# Patient Record
Sex: FEMALE | Race: WHITE | NOT HISPANIC OR LATINO | Employment: FULL TIME | URBAN - METROPOLITAN AREA
[De-identification: names, ages, dates, MRNs, and addresses within clinical notes are randomized per-mention and may not be internally consistent; named-entity substitution may affect disease eponyms.]

---

## 2017-03-02 ENCOUNTER — ALLSCRIPTS OFFICE VISIT (OUTPATIENT)
Dept: OTHER | Facility: OTHER | Age: 57
End: 2017-03-02

## 2017-03-02 DIAGNOSIS — E78.5 HYPERLIPIDEMIA: ICD-10-CM

## 2017-03-02 DIAGNOSIS — N95.2 POSTMENOPAUSAL ATROPHIC VAGINITIS: ICD-10-CM

## 2017-03-02 DIAGNOSIS — Z12.39 ENCOUNTER FOR OTHER SCREENING FOR MALIGNANT NEOPLASM OF BREAST: ICD-10-CM

## 2017-04-19 ENCOUNTER — APPOINTMENT (OUTPATIENT)
Dept: PHYSICAL THERAPY | Facility: REHABILITATION | Age: 57
End: 2017-04-19
Payer: COMMERCIAL

## 2017-04-19 DIAGNOSIS — N95.2 POSTMENOPAUSAL ATROPHIC VAGINITIS: ICD-10-CM

## 2017-04-19 PROCEDURE — 97530 THERAPEUTIC ACTIVITIES: CPT

## 2017-04-19 PROCEDURE — 97162 PT EVAL MOD COMPLEX 30 MIN: CPT

## 2017-05-18 ENCOUNTER — GENERIC CONVERSION - ENCOUNTER (OUTPATIENT)
Dept: OTHER | Facility: OTHER | Age: 57
End: 2017-05-18

## 2017-07-05 ENCOUNTER — GENERIC CONVERSION - ENCOUNTER (OUTPATIENT)
Dept: OTHER | Facility: OTHER | Age: 57
End: 2017-07-05

## 2017-09-19 ENCOUNTER — LAB (OUTPATIENT)
Dept: LAB | Facility: HOSPITAL | Age: 57
End: 2017-09-19
Payer: COMMERCIAL

## 2017-09-19 ENCOUNTER — ALLSCRIPTS OFFICE VISIT (OUTPATIENT)
Dept: OTHER | Facility: OTHER | Age: 57
End: 2017-09-19

## 2017-09-19 DIAGNOSIS — N39.0 URINARY TRACT INFECTION: ICD-10-CM

## 2017-09-19 LAB
BACTERIA UR QL AUTO: ABNORMAL /HPF
BILIRUB UR QL STRIP: NEGATIVE
BILIRUB UR QL STRIP: NORMAL
CLARITY UR: CLEAR
CLARITY UR: NORMAL
COLOR UR: YELLOW
COLOR UR: YELLOW
GLUCOSE (HISTORICAL): NORMAL
GLUCOSE UR STRIP-MCNC: NEGATIVE MG/DL
HGB UR QL STRIP.AUTO: NEGATIVE
HGB UR QL STRIP.AUTO: NORMAL
HYALINE CASTS #/AREA URNS LPF: ABNORMAL /LPF
KETONES UR STRIP-MCNC: NEGATIVE MG/DL
KETONES UR STRIP-MCNC: NORMAL MG/DL
LEUKOCYTE ESTERASE UR QL STRIP: ABNORMAL
LEUKOCYTE ESTERASE UR QL STRIP: NORMAL
NITRITE UR QL STRIP: NORMAL
NITRITE UR QL STRIP: POSITIVE
NON-SQ EPI CELLS URNS QL MICRO: ABNORMAL /HPF
PH UR STRIP.AUTO: 5 [PH]
PH UR STRIP.AUTO: 6 [PH] (ref 4.5–8)
PROT UR STRIP-MCNC: NEGATIVE MG/DL
PROT UR STRIP-MCNC: NORMAL MG/DL
RBC #/AREA URNS AUTO: ABNORMAL /HPF
SP GR UR STRIP.AUTO: 1.01
SP GR UR STRIP.AUTO: 1.01 (ref 1–1.03)
UROBILINOGEN UR QL STRIP.AUTO: 0.2 E.U./DL
UROBILINOGEN UR QL STRIP.AUTO: NORMAL
WBC #/AREA URNS AUTO: ABNORMAL /HPF

## 2017-09-19 PROCEDURE — 87186 SC STD MICRODIL/AGAR DIL: CPT

## 2017-09-19 PROCEDURE — 87077 CULTURE AEROBIC IDENTIFY: CPT

## 2017-09-19 PROCEDURE — 87086 URINE CULTURE/COLONY COUNT: CPT

## 2017-09-19 PROCEDURE — 81001 URINALYSIS AUTO W/SCOPE: CPT

## 2017-09-23 LAB — BACTERIA UR CULT: NORMAL

## 2017-10-26 NOTE — PROGRESS NOTES
Assessment  1  Urinary tract infection (599 0) (N39 0)   2  Atopic dermatitis (691 8) (L20 9)    Plan  Urinary tract infection    · Ciprofloxacin HCl - 500 MG Oral Tablet; Take 1 tablet twice daily   · Cephalexin 250 MG Oral Capsule (Keflex); One pill daily as needed   · (1) URINALYSIS w URINE C/S REFLEX (will reflex a microscopy if leukocytes, occult  blood, or nitrites are not within normal limits); Status:Active - Retrospective By Protocol  Authorization; Requested for:[id number];    · Urine Dip Non-Automated- POC; Status:Complete - Retrospective By Protocol  Authorization;   Done: [id number] 03:32PM    Discussion/Summary    #1  Acute urinary tract infection  Patient's urine was positive for both weight and trace red blood cells  Patient's urine will be sent for culture and patient was placed on Cipro 500 mg by mouth twice a day for 5 days  Patient was told we will call her if we need to change the antibiotic after cultures obtained  Atopic dermatitis  Patient was told to obtain over-the-counter Lotrimin cream and apply this 50-50 with hydrocortisone cream to see if it alleviates her rash and pruritus  Patient was told to contact her dermatologist if it is not working  The patient was counseled regarding diagnostic results,-- instructions for management,-- risk factor reductions,-- prognosis,-- patient and family education,-- impressions,-- risks and benefits of treatment options,-- importance of compliance with treatment  Possible side effects of new medications were reviewed with the patient/guardian today  The treatment plan was reviewed with the patient/guardian  The patient/guardian understands and agrees with the treatment plan      Chief Complaint  Patient is here today for a uti      History of Present Illness  HPI: Patient is a 54-year-old female with a history of no major medical problems  Patient is complaining of dysuria and urinary frequency over the past few days   She denies any fever or chills or back pain  She does have a history of recurrent urinary tract infections which we've been able to control with her taking Keflex after sexual intercourse  Hospital Based Practices Required Assessment:   Pain Assessment   the patient states they have pain  The pain is located in the Dysuria  The patient describes the pain as burning  (on a scale of 0 to 10, the patient rates the pain at 2 )   Abuse And Domestic Violence Screen    Yes, the patient is safe at home  -- The patient states no one is hurting them  Depression And Suicide Screen  No, the patient has not had thoughts of hurting themself  No, the patient has not felt depressed in the past 7 days  Primary Language is  English  Readiness To Learn: Receptive  Barriers To Learning: none  Preferred Learning: verbal   Education Completed: disease/condition   Teaching Method: verbal   Person Taught: patient   Evaluation Of Learning: verbalized/demonstrated understanding      Review of Systems    Constitutional: feeling poorly-- and-- feeling tired, but-- no fever,-- no recent weight gain,-- no chills-- and-- no recent weight loss  ENT: no ear ache, no loss of hearing, no nosebleeds or nasal discharge, no sore throat or hoarseness  Cardiovascular: no complaints of slow or fast heart rate, no chest pain, no palpitations, no leg claudication or lower extremity edema  Respiratory: no complaints of shortness of breath, no wheezing, no dyspnea on exertion, no orthopnea or PND  Breasts: no complaints of breast pain, breast lump or nipple discharge  Gastrointestinal: no complaints of abdominal pain, no constipation, no nausea or diarrhea, no vomiting, no bloody stools  Genitourinary: dysuria-- and-- Urinary frequency, but-- no pelvic pain,-- no vaginal discharge,-- no incontinence,-- no dysmenorrhea-- and-- no unexplained vaginal bleeding     Musculoskeletal: no complaints of arthralgia, no myalgia, no joint swelling or stiffness, no limb pain or swelling  Integumentary: rash,-- itching-- and-- Rash which developed between the breasts itching, but-- no dry skin,-- no skin lesions-- and-- no skin wound  Neurological: no complaints of headache, no confusion, no numbness or tingling, no dizziness or fainting  ROS reviewed  Active Problems  1  Acute sinusitis (461 9) (J01 90)   2  Encounter for routine gynecological examination (V72 31) (Z01 419)   3  Encounter for screening breast examination (V76 10) (Z12 31)   4  Encounter for screening colonoscopy (V76 51) (Z12 11)   5  Encounter for screening mammogram for breast cancer (V76 12) (Z12 31)   6  Fatigue (780 79) (R53 83)   7  Hyperlipidemia (272 4) (E78 5)   8  Insect bite (919 4,E906 4) (W57 XXXA)   9  Menopausal problem (627 9)   10  Urinary tract infection (599 0) (N39 0)   11  Vaginal atrophy (627 3) (N95 2)   12  Vitamin D deficiency (268 9)    Past Medical History  Active Problems And Past Medical History Reviewed: The active problems and past medical history were reviewed and updated today  Social History   · Currently sexually active   · Daily caffeine consumption, 1 serving a day   · Exercises 3 to 4 times per week (V49 89) (Z78 9)   ·    · 14 years   · Never a smoker   · Never A Smoker   · No illicit drug use   · Occasional alcohol use   · Two children  The social history was reviewed and updated today  The social history was reviewed and is unchanged  Family History  Family History Reviewed: The family history was reviewed and updated today  Current Meds   1  Fluticasone Propionate 50 MCG/ACT Nasal Suspension; USE 1 SPRAY IN EACH   NOSTRIL TWICE DAILY; Therapy: 48KZP4407 to (Last Ardath Suzette)  Requested for: 69MKN3562 Ordered   2  Vagifem 10 MCG Vaginal Tablet; Insert intravaginally twice weely; Therapy: 27Apr2017 to (Last Faustine Life)  Requested for: 27Apr2017 Ordered   3  Vagifem 10 MCG Vaginal Tablet;  Insert one tabl intravaginally daily x 2 weeks then twice   weekly; Therapy: [id number] to (Last Rx:02Mar2017)  Requested for: 02Mar2017 Ordered    The medication list was reviewed and updated today  Allergies  1  Codeine Derivatives    Physical Exam    Constitutional Pleasant, healthy-appearing 53-year-old female who is awake alert  Eyes   Conjunctiva and lids: No swelling, erythema or discharge  Pupils and irises: Equal, round and reactive to light  Ears, Nose, Mouth, and Throat   External inspection of ears and nose: Normal     Otoscopic examination: Tympanic membranes translucent with normal light reflex  Canals patent without erythema  Nasal mucosa, septum, and turbinates: Normal without edema or erythema  Oropharynx: Normal with no erythema, edema, exudate or lesions  Pulmonary   Respiratory effort: No increased work of breathing or signs of respiratory distress  Auscultation of lungs: Clear to auscultation  Cardiovascular   Palpation of heart: Normal PMI, no thrills  Auscultation of heart: Normal rate and rhythm, normal S1 and S2, without murmurs  Examination of extremities for edema and/or varicosities: Normal     Carotid pulses: Normal     Abdomen   Abdomen: Abnormal  -- Soft nontender with slight suprapubic tenderness to palpation and fullness, no CVA tenderness to percussion  Liver and spleen: No hepatomegaly or splenomegaly  Lymphatic   Palpation of lymph nodes in neck: No lymphadenopathy  Musculoskeletal   Gait and station: Normal     Digits and nails: Normal without clubbing or cyanosis  Inspection/palpation of joints, bones, and muscles: Normal     Skin   Skin and subcutaneous tissue: Normal without rashes or lesions  Examination of the skin for lesions: Abnormal  -- Slightly raised reddish rash between both breasts which she states is causing a lot of itching  Neurologic   Cranial nerves: Cranial nerves 2-12 intact  Reflexes: 2+ and symmetric  Sensation: No sensory loss      Psychiatric Orientation to person, place, and time: Normal     Mood and affect: Normal          Results/Data  Urine Dip Non-Automated- POC [id number] 03:32PM Kike Santana     Test Name Result Flag Reference   Color Yellow     Clarity Transparent     Leukocytes ++     Nitrite neg     Blood trace     Bilirubin neg     Urobilinogen neg     Protein neg     Ph 5     Specific Gravity 1 010     Ketone neg     Glucose normal         Signatures   Electronically signed by : Marion Holden DO; Sep 19 2017  6:08PM EST                       (Author)

## 2018-01-14 VITALS
WEIGHT: 122 LBS | HEART RATE: 76 BPM | HEIGHT: 64 IN | BODY MASS INDEX: 20.83 KG/M2 | SYSTOLIC BLOOD PRESSURE: 116 MMHG | DIASTOLIC BLOOD PRESSURE: 64 MMHG | OXYGEN SATURATION: 99 %

## 2018-02-09 ENCOUNTER — TRANSCRIBE ORDERS (OUTPATIENT)
Dept: ADMINISTRATIVE | Facility: HOSPITAL | Age: 58
End: 2018-02-09

## 2018-04-25 NOTE — PROGRESS NOTES
Assessment/Plan:    Calcium 1200-1500mg + 600-1000 IU Vit D daily  Pap with HR HPV q 3 years-done  Annual mammogram Colonoscopy-due now  Monthly BSE  Exercise 150 minutes per week minimum  Kegels 20 times twice daily  Silicone based lubricant with sex  Declines vagifem rx currently  Diagnoses and all orders for this visit:    Encntr for gyn exam (general) (routine) w/o abn findings  -     Liquid-based pap, screening    Encounter for screening mammogram for breast cancer  -     Mammo screening bilateral w 3d & cad; Future    Vaginal atrophy    Other orders  -     estradiol (VAGIFEM) 10 MCG TABS vaginal tablet; Insert into the vagina          Subjective:      Patient ID: Dusty Payne is a 61 y o  female  Patient here for annual visit  Followed up with [facility] and never needed to use vagifem  She swears by slippery stuff lubricant  No dyspareunia and believes the difference is night and day  Does her kegels regularly  No health concerns  NO vaginal bleeding  LMP at age 51  No menopausal symptoms   is well  The following portions of the patient's history were reviewed and updated as appropriate: allergies, current medications, past family history, past medical history, past social history, past surgical history and problem list     Review of Systems   Constitutional: Negative  Negative for activity change, appetite change, chills, diaphoresis, fatigue, fever and unexpected weight change  HENT: Negative for congestion, dental problem, sneezing, sore throat and trouble swallowing  Eyes: Negative for visual disturbance  Respiratory: Negative for chest tightness and shortness of breath  Cardiovascular: Negative for chest pain and leg swelling  Gastrointestinal: Negative for abdominal pain, constipation, diarrhea, nausea and vomiting     Genitourinary: Negative for difficulty urinating, dyspareunia, dysuria, frequency, hematuria, menstrual problem, pelvic pain, urgency, vaginal bleeding, vaginal discharge and vaginal pain  Musculoskeletal: Negative for back pain and neck pain  Skin: Negative  Allergic/Immunologic: Negative  Neurological: Negative for weakness and headaches  Hematological: Negative for adenopathy  Psychiatric/Behavioral: Negative  Objective: There were no vitals taken for this visit  Physical Exam   Constitutional: She is oriented to person, place, and time  She appears well-developed and well-nourished  HENT:   Head: Normocephalic and atraumatic  Eyes: Right eye exhibits no discharge  Left eye exhibits no discharge  Neck: Normal range of motion  Neck supple  Cardiovascular: Normal rate, regular rhythm, normal heart sounds and intact distal pulses  Pulmonary/Chest: Effort normal and breath sounds normal    Abdominal: Soft  Genitourinary: Vagina normal and uterus normal  Rectal exam shows no external hemorrhoid  No breast swelling, tenderness, discharge or bleeding  No labial fusion  There is no rash, tenderness, lesion or injury on the right labia  There is no rash, tenderness, lesion or injury on the left labia  Cervix exhibits no motion tenderness, no discharge and no friability  Right adnexum displays no mass, no tenderness and no fullness  Left adnexum displays no mass, no tenderness and no fullness  No erythema, tenderness or bleeding in the vagina  No foreign body in the vagina  No signs of injury around the vagina  No vaginal discharge found  Genitourinary Comments: Slight urethral prolapse  vulvovagianl atrophy   Musculoskeletal: Normal range of motion  Lymphadenopathy:     She has no cervical adenopathy  Right: No inguinal adenopathy present  Left: No inguinal adenopathy present  Neurological: She is alert and oriented to person, place, and time  Skin: Skin is warm and dry  Psychiatric: She has a normal mood and affect  Nursing note and vitals reviewed

## 2018-04-26 ENCOUNTER — TELEPHONE (OUTPATIENT)
Dept: INTERNAL MEDICINE CLINIC | Facility: CLINIC | Age: 58
End: 2018-04-26

## 2018-04-26 ENCOUNTER — TELEPHONE (OUTPATIENT)
Dept: GASTROENTEROLOGY | Facility: CLINIC | Age: 58
End: 2018-04-26

## 2018-04-26 ENCOUNTER — ANNUAL EXAM (OUTPATIENT)
Dept: GYNECOLOGY | Facility: CLINIC | Age: 58
End: 2018-04-26
Payer: COMMERCIAL

## 2018-04-26 VITALS
BODY MASS INDEX: 20.83 KG/M2 | DIASTOLIC BLOOD PRESSURE: 80 MMHG | WEIGHT: 122 LBS | HEIGHT: 64 IN | HEART RATE: 85 BPM | SYSTOLIC BLOOD PRESSURE: 128 MMHG

## 2018-04-26 DIAGNOSIS — E55.9 VITAMIN D DEFICIENCY: ICD-10-CM

## 2018-04-26 DIAGNOSIS — N95.2 VAGINAL ATROPHY: ICD-10-CM

## 2018-04-26 DIAGNOSIS — R53.83 FATIGUE, UNSPECIFIED TYPE: ICD-10-CM

## 2018-04-26 DIAGNOSIS — E78.01 FAMILIAL HYPERCHOLESTEROLEMIA: ICD-10-CM

## 2018-04-26 DIAGNOSIS — Z12.11 COLON CANCER SCREENING: Primary | ICD-10-CM

## 2018-04-26 DIAGNOSIS — Z12.31 ENCOUNTER FOR SCREENING MAMMOGRAM FOR BREAST CANCER: ICD-10-CM

## 2018-04-26 DIAGNOSIS — Z01.419 ENCNTR FOR GYN EXAM (GENERAL) (ROUTINE) W/O ABN FINDINGS: Primary | ICD-10-CM

## 2018-04-26 PROCEDURE — S0612 ANNUAL GYNECOLOGICAL EXAMINA: HCPCS | Performed by: NURSE PRACTITIONER

## 2018-04-26 PROCEDURE — G0145 SCR C/V CYTO,THINLAYER,RESCR: HCPCS | Performed by: NURSE PRACTITIONER

## 2018-04-26 PROCEDURE — 87624 HPV HI-RISK TYP POOLED RSLT: CPT | Performed by: NURSE PRACTITIONER

## 2018-04-26 RX ORDER — ESTRADIOL 10 UG/1
INSERT VAGINAL
Status: ON HOLD | COMMUNITY
Start: 2017-04-27 | End: 2018-08-23 | Stop reason: ALTCHOICE

## 2018-04-26 NOTE — PATIENT INSTRUCTIONS
Calcium 1200-1500mg + 600-1000 IU Vit D daily  Pap with HR HPV q 3 years-done  Annual mammogram Colonoscopy-due now  Monthly BSE  Exercise 150 minutes per week minimum  Kegels 20 times twice daily  Silicone based lubricant with sex  Declines vagifem rx currently

## 2018-04-26 NOTE — TELEPHONE ENCOUNTER
Pt would like some labs placed in her chart for her yearly visit,she will [facility]  She will schedule appt after she gets her bloodwork,she can be reached at [phone]

## 2018-05-01 ENCOUNTER — HOSPITAL ENCOUNTER (OUTPATIENT)
Dept: RADIOLOGY | Age: 58
Discharge: HOME | End: 2018-05-01
Payer: COMMERCIAL

## 2018-05-01 DIAGNOSIS — Z12.31 ENCOUNTER FOR SCREENING MAMMOGRAM FOR BREAST CANCER: ICD-10-CM

## 2018-05-01 LAB — HPV RRNA GENITAL QL NAA+PROBE: NORMAL

## 2018-05-01 PROCEDURE — 77067 SCR MAMMO BI INCL CAD: CPT

## 2018-05-01 PROCEDURE — 77063 BREAST TOMOSYNTHESIS BI: CPT

## 2018-05-02 LAB
LAB AP GYN PRIMARY INTERPRETATION: NORMAL
Lab: NORMAL

## 2018-07-26 ENCOUNTER — OFFICE VISIT (OUTPATIENT)
Dept: INTERNAL MEDICINE CLINIC | Facility: CLINIC | Age: 58
End: 2018-07-26
Payer: COMMERCIAL

## 2018-07-26 VITALS
WEIGHT: 119 LBS | SYSTOLIC BLOOD PRESSURE: 122 MMHG | HEIGHT: 64 IN | RESPIRATION RATE: 16 BRPM | OXYGEN SATURATION: 99 % | BODY MASS INDEX: 20.32 KG/M2 | HEART RATE: 109 BPM | DIASTOLIC BLOOD PRESSURE: 74 MMHG | TEMPERATURE: 99.2 F

## 2018-07-26 DIAGNOSIS — J06.9 UPPER RESPIRATORY TRACT INFECTION, UNSPECIFIED TYPE: Primary | ICD-10-CM

## 2018-07-26 PROCEDURE — 99213 OFFICE O/P EST LOW 20 MIN: CPT | Performed by: NURSE PRACTITIONER

## 2018-07-26 RX ORDER — AZITHROMYCIN 250 MG/1
TABLET, FILM COATED ORAL
Qty: 6 TABLET | Refills: 0 | Status: SHIPPED | OUTPATIENT
Start: 2018-07-26 | End: 2018-07-30

## 2018-07-26 NOTE — PROGRESS NOTES
Assessment/Plan:    Upper respiratory tract infection  Symptoms persist >2 weeks with inability to complete treatment with amoxicillin as prescribed by urgent care  Pt reports that her symptoms always respond to azithromycin  She is given an rx for a z pack and mucinex dm BID  Pt advised to use flonase BID as well to help decrease nasal inflammation  Recommended gargling with salt water and dressing warmly for work  Pt instructed to call for reevaluation if sx worsen or persist          Diagnoses and all orders for this visit:    Upper respiratory tract infection, unspecified type  -     azithromycin (ZITHROMAX) 250 mg tablet; Take 2 tablets today then 1 tablet daily x 4 days  -     dextromethorphan-guaifenesin (MUCINEX DM)  MG per 12 hr tablet; Take 1 tablet by mouth 2 (two) times a day as needed for cough          Subjective:      Patient ID: Avel Elmore is a 61 y o  female  Pt is a 61 y o  y/o female who is seen today for evaluation of URI symptoms that began 2 weeks ago  She was evaluated at urgent care and was treated with amoxicillin  She did not tolerate the medication well with terrible GI side effects so she did not complete therapy  Her symptoms include sore throat, sputum production that "sits in her throat", ear and sinus pressure  She denies headache, dizziness, fever/chills  Appetite is normal, no N/V/D  Pt has tried otc cough suppressants  She has pmh of seasonal allergies but her symptoms occur in the fall  Pt reports that her office at work was relocated to a small space that is excessively air conditioned and she feels that her environment is predisposing her to URI            The following portions of the patient's history were reviewed and updated as appropriate: allergies, current medications, past family history, past medical history, past social history, past surgical history and problem list     Review of Systems   Constitutional: Negative for appetite change, chills, fatigue and fever  HENT: Positive for congestion, ear pain, postnasal drip, sore throat, trouble swallowing and voice change  Negative for sinus pressure  Respiratory: Negative for cough, chest tightness, shortness of breath and wheezing  Cardiovascular: Negative for chest pain, palpitations and leg swelling  Gastrointestinal: Negative for diarrhea, nausea and vomiting  Musculoskeletal: Negative for myalgias  Allergic/Immunologic: Positive for environmental allergies (fall seasonal allergies)  Neurological: Negative for dizziness and headaches  Hematological: Negative for adenopathy  Psychiatric/Behavioral: Negative for sleep disturbance  Objective:      /74 (BP Location: Left arm, Patient Position: Sitting, Cuff Size: Standard)   Pulse (!) 109   Temp 99 2 °F (37 3 °C)   Resp 16   Ht 5' 4" (1 626 m)   Wt 54 kg (119 lb)   SpO2 99%   BMI 20 43 kg/m²          Physical Exam   Constitutional: She is oriented to person, place, and time  She appears well-developed and well-nourished  She is cooperative  HENT:   Head: Normocephalic  Right Ear: Hearing, tympanic membrane, external ear and ear canal normal  Tympanic membrane is not bulging  No middle ear effusion  Left Ear: Hearing, tympanic membrane, external ear and ear canal normal  Tympanic membrane is not bulging  No middle ear effusion  Nose: Mucosal edema present  No rhinorrhea  Mouth/Throat: Mucous membranes are not dry  No oropharyngeal exudate, posterior oropharyngeal edema, posterior oropharyngeal erythema or tonsillar abscesses  Post nasal drip  Eyes: Conjunctivae and lids are normal  Pupils are equal, round, and reactive to light  Neck: Normal range of motion  No JVD present  Carotid bruit is not present  Cardiovascular: Normal rate, regular rhythm, normal heart sounds and intact distal pulses  No murmur heard  Pulmonary/Chest: Effort normal and breath sounds normal  No accessory muscle usage   No respiratory distress  She has no decreased breath sounds  She has no wheezes  She has no rhonchi  She has no rales  Musculoskeletal: Normal range of motion  She exhibits no edema  Lymphadenopathy:        Head (right side): No submental, no submandibular, no tonsillar, no preauricular, no posterior auricular and no occipital adenopathy present  Head (left side): No submental, no submandibular, no tonsillar, no preauricular, no posterior auricular and no occipital adenopathy present  She has no cervical adenopathy  Neurological: She is alert and oriented to person, place, and time  She has normal strength  Skin: Skin is warm, dry and intact  Psychiatric: She has a normal mood and affect  Her speech is normal and behavior is normal  Judgment and thought content normal  Cognition and memory are normal    Vitals reviewed

## 2018-07-26 NOTE — ASSESSMENT & PLAN NOTE
Symptoms persist >2 weeks with inability to complete treatment with amoxicillin as prescribed by urgent care  Pt reports that her symptoms always respond to azithromycin  She is given an rx for a z pack and mucinex dm BID  Pt advised to use flonase BID as well to help decrease nasal inflammation  Recommended gargling with salt water and dressing warmly for work    Pt instructed to call for reevaluation if sx worsen or persist

## 2018-07-27 ENCOUNTER — OFFICE VISIT (OUTPATIENT)
Dept: GASTROENTEROLOGY | Facility: AMBULARY SURGERY CENTER | Age: 58
End: 2018-07-27
Payer: COMMERCIAL

## 2018-07-27 VITALS
WEIGHT: 121.6 LBS | HEIGHT: 64 IN | TEMPERATURE: 97.5 F | HEART RATE: 86 BPM | DIASTOLIC BLOOD PRESSURE: 78 MMHG | SYSTOLIC BLOOD PRESSURE: 121 MMHG | BODY MASS INDEX: 20.76 KG/M2

## 2018-07-27 DIAGNOSIS — Z12.11 COLON CANCER SCREENING: Primary | ICD-10-CM

## 2018-07-27 PROCEDURE — 99243 OFF/OP CNSLTJ NEW/EST LOW 30: CPT | Performed by: INTERNAL MEDICINE

## 2018-07-27 NOTE — LETTER
July 27, 2018     Carmen Lopez DO  [address]  [address] [place] [id number]    Patient: Wen Gasca   YOB: 1960   Date of Visit: 7/27/2018       Dear Dr Enedelia Humphrey: Thank you for referring Alvin Mack to me for evaluation  Below are my notes for this consultation  If you have questions, please do not hesitate to call me  I look forward to following your patient along with you  Sincerely,        Latricia Pérez MD        CC: No Recipients  Latricia Pérze MD  7/27/2018  8:03 PM  Sign at close encounter  Consultation - [facility]  Wen Gasca 61 y o  female MRN: [id number]          Assessment & Plan:    Very pleasant 58-year-old female here for average risk screening colonoscopy who is somewhat anxious about the procedure  -we will schedule her colonoscopy  -I discussed with her the risks of the procedure including bleeding, surgery, perforation, missed polyp detection rate        _____________________________________________________________        CC:  Evaluation for colonoscopy    HPI:  Wen Gasca is a 61 y  o female who was referred for evaluation of colonoscopy  As you know this is a very pleasant and healthy 58-year-old female, with no significant medical history, no significant surgical history, denies any GI complaints  Denies any tobacco, rarely drinks  She works in sales and marketing  She has never had a colonoscopy denies any family history of GI or associated malignancies  Patient denies any nausea, vomiting, heartburn, dysphagia, diarrhea, constipation, melena, rectal bleeding  ROS:  The remainder of the ROS was negative except for the pertinent positives mentioned in HPI           Allergies: Codeine    Medications:   Current Outpatient Prescriptions:     azithromycin (ZITHROMAX) 250 mg tablet, Take 2 tablets today then 1 tablet daily x 4 days, Disp: 6 tablet, Rfl: 0    dextromethorphan-guaifenesin (MUCINEX DM)  MG per 12 hr tablet, Take 1 tablet by mouth 2 (two) times a day as needed for cough, Disp: 30 tablet, Rfl: 0    estradiol (VAGIFEM) 10 MCG TABS vaginal tablet, Insert into the vagina, Disp: , Rfl: '    Past Medical History:   Diagnosis Date    Nonadherence to medical treatment     No show for 2 pelvic floor PT appt       Past Surgical History:   Procedure Laterality Date    TUBAL LIGATION         Family History   Problem Relation Age of Onset    Heart attack Father 72    No Known Problems Mother     No Known Problems Sister     No Known Problems Brother     No Known Problems Daughter     No Known Problems Son     No Known Problems Sister     No Known Problems Sister         reports that she has never smoked  She has never used smokeless tobacco  She reports that she drinks alcohol  She reports that she does not use drugs            Physical Exam:     /78 (BP Location: Left arm, Patient Position: Sitting, Cuff Size: Standard)   Pulse 86   Temp 97 5 °F (36 4 °C) (Tympanic)   Ht 5' 4" (1 626 m)   Wt 55 2 kg (121 lb 9 6 oz)   BMI 20 87 kg/m²      Gen: wn/wd, NAD  HEENT: anicteric, MMM, no cervical LAD  CVS: RRR, no m/r/g  CHEST: CTA b/l  ABD: +BS, soft, NT,ND, no hepatosplenomegaly  EXT: no c/c/e  NEURO: aaox3  SKIN: NO rashes

## 2018-07-27 NOTE — PROGRESS NOTES
Consultation - [facility]  Yanet Ceron 61 y o  female MRN: [id number]          Assessment & Plan:    Very pleasant 71-year-old female here for average risk screening colonoscopy who is somewhat anxious about the procedure  -we will schedule her colonoscopy  -I discussed with her the risks of the procedure including bleeding, surgery, perforation, missed polyp detection rate        _____________________________________________________________        CC:  Evaluation for colonoscopy    HPI:  Yanet Ceron is a 61 y  o female who was referred for evaluation of colonoscopy  As you know this is a very pleasant and healthy 71-year-old female, with no significant medical history, no significant surgical history, denies any GI complaints  Denies any tobacco, rarely drinks  She works in sales and marketing  She has never had a colonoscopy denies any family history of GI or associated malignancies  Patient denies any nausea, vomiting, heartburn, dysphagia, diarrhea, constipation, melena, rectal bleeding  ROS:  The remainder of the ROS was negative except for the pertinent positives mentioned in HPI           Allergies: Codeine    Medications:   Current Outpatient Prescriptions:     azithromycin (ZITHROMAX) 250 mg tablet, Take 2 tablets today then 1 tablet daily x 4 days, Disp: 6 tablet, Rfl: 0    dextromethorphan-guaifenesin (MUCINEX DM)  MG per 12 hr tablet, Take 1 tablet by mouth 2 (two) times a day as needed for cough, Disp: 30 tablet, Rfl: 0    estradiol (VAGIFEM) 10 MCG TABS vaginal tablet, Insert into the vagina, Disp: , Rfl: '    Past Medical History:   Diagnosis Date    Nonadherence to medical treatment     No show for 2 pelvic floor PT appt       Past Surgical History:   Procedure Laterality Date    TUBAL LIGATION         Family History   Problem Relation Age of Onset    Heart attack Father 72    No Known Problems Mother     No Known Problems Sister     No Known Problems Brother     No Known Problems Daughter     No Known Problems Son     No Known Problems Sister     No Known Problems Sister         reports that she has never smoked  She has never used smokeless tobacco  She reports that she drinks alcohol  She reports that she does not use drugs            Physical Exam:     /78 (BP Location: Left arm, Patient Position: Sitting, Cuff Size: Standard)   Pulse 86   Temp 97 5 °F (36 4 °C) (Tympanic)   Ht 5' 4" (1 626 m)   Wt 55 2 kg (121 lb 9 6 oz)   BMI 20 87 kg/m²     Gen: wn/wd, NAD  HEENT: anicteric, MMM, no cervical LAD  CVS: RRR, no m/r/g  CHEST: CTA b/l  ABD: +BS, soft, NT,ND, no hepatosplenomegaly  EXT: no c/c/e  NEURO: aaox3  SKIN: NO rashes

## 2018-07-30 DIAGNOSIS — Z12.11 ENCOUNTER FOR SCREENING COLONOSCOPY: Primary | ICD-10-CM

## 2018-07-30 NOTE — PROGRESS NOTES
GI CALLED FOR A PHYSCIAN TO PHYSICIAN REFERRAL TO DR LYNDA FIERRO WITH A DX OF : Z12 11  REFERRAL WAS PLACED

## 2018-08-07 ENCOUNTER — OFFICE VISIT (OUTPATIENT)
Dept: INTERNAL MEDICINE CLINIC | Facility: CLINIC | Age: 58
End: 2018-08-07
Payer: COMMERCIAL

## 2018-08-07 VITALS
DIASTOLIC BLOOD PRESSURE: 84 MMHG | SYSTOLIC BLOOD PRESSURE: 120 MMHG | HEIGHT: 64 IN | TEMPERATURE: 98.7 F | BODY MASS INDEX: 21 KG/M2 | OXYGEN SATURATION: 96 % | WEIGHT: 123 LBS | HEART RATE: 77 BPM

## 2018-08-07 DIAGNOSIS — J30.1 SEASONAL ALLERGIC RHINITIS DUE TO POLLEN: Primary | ICD-10-CM

## 2018-08-07 DIAGNOSIS — J06.9 UPPER RESPIRATORY TRACT INFECTION, UNSPECIFIED TYPE: ICD-10-CM

## 2018-08-07 PROCEDURE — 3008F BODY MASS INDEX DOCD: CPT | Performed by: INTERNAL MEDICINE

## 2018-08-07 PROCEDURE — 99213 OFFICE O/P EST LOW 20 MIN: CPT | Performed by: INTERNAL MEDICINE

## 2018-08-07 RX ORDER — FLUTICASONE PROPIONATE 50 MCG
2 SPRAY, SUSPENSION (ML) NASAL DAILY
Qty: 16 G | Refills: 3 | Status: SHIPPED | OUTPATIENT
Start: 2018-08-07 | End: 2018-12-05 | Stop reason: SDUPTHER

## 2018-08-07 NOTE — ASSESSMENT & PLAN NOTE
Patient had been seen recently in a [facility] facility and also in our office for an upper respiratory tract infection, allergic rhinitis, sinusitis  With her last visit in the office she was placed on a Zithromax Z-Fabio and also instructions to take Mucinex DM to help with her congestion and mucus production  She states that she is still producing a lot of mucus, postnasal drip and on questioning apparently she was not taking the Mucinex DM but the Mucinex D with decongestant which probably made her mucus thicker  Patient is still having problems with station tube dysfunction on the right but exam was normal   She does have erythema to the nares and enlarged written turbinates consistent with allergic rhinitis, thick whitish postnasal drip  Patient was given a prescription for Flonase nasal spray to use 1 spray to each nostril twice a day along with taking Tussin DM to help break up the mucus  She was told if not feeling better by the end week to please call    She was also told that it may take an extended period of time for her to completely recover and have normalization from a station tube dysfunction

## 2018-08-13 ENCOUNTER — ANESTHESIA EVENT (OUTPATIENT)
Dept: PERIOP | Facility: AMBULARY SURGERY CENTER | Age: 58
End: 2018-08-13
Payer: COMMERCIAL

## 2018-08-23 ENCOUNTER — HOSPITAL ENCOUNTER (OUTPATIENT)
Facility: AMBULARY SURGERY CENTER | Age: 58
Setting detail: OUTPATIENT SURGERY
Discharge: HOME | End: 2018-08-23
Attending: INTERNAL MEDICINE | Admitting: INTERNAL MEDICINE
Payer: COMMERCIAL

## 2018-08-23 ENCOUNTER — TELEPHONE (OUTPATIENT)
Dept: GASTROENTEROLOGY | Facility: CLINIC | Age: 58
End: 2018-08-23

## 2018-08-23 ENCOUNTER — ANESTHESIA (OUTPATIENT)
Dept: PERIOP | Facility: AMBULARY SURGERY CENTER | Age: 58
End: 2018-08-23
Payer: COMMERCIAL

## 2018-08-23 VITALS
HEIGHT: 64 IN | TEMPERATURE: 97.4 F | WEIGHT: 120 LBS | SYSTOLIC BLOOD PRESSURE: 122 MMHG | OXYGEN SATURATION: 99 % | DIASTOLIC BLOOD PRESSURE: 76 MMHG | HEART RATE: 62 BPM | RESPIRATION RATE: 20 BRPM | BODY MASS INDEX: 20.49 KG/M2

## 2018-08-23 PROCEDURE — G0121 COLON CA SCRN NOT HI RSK IND: HCPCS | Performed by: INTERNAL MEDICINE

## 2018-08-23 RX ORDER — PROPOFOL 10 MG/ML
INJECTION, EMULSION INTRAVENOUS AS NEEDED
Status: DISCONTINUED | OUTPATIENT
Start: 2018-08-23 | End: 2018-08-23 | Stop reason: SURG

## 2018-08-23 RX ORDER — CETIRIZINE HYDROCHLORIDE 10 MG/1
5 TABLET ORAL AS NEEDED
COMMUNITY

## 2018-08-23 RX ORDER — SODIUM CHLORIDE, SODIUM LACTATE, POTASSIUM CHLORIDE, CALCIUM CHLORIDE 600; 310; 30; 20 MG/100ML; MG/100ML; MG/100ML; MG/100ML
125 INJECTION, SOLUTION INTRAVENOUS CONTINUOUS
Status: DISCONTINUED | OUTPATIENT
Start: 2018-08-23 | End: 2018-08-23 | Stop reason: HOSPADM

## 2018-08-23 RX ORDER — SODIUM CHLORIDE 9 MG/ML
INJECTION, SOLUTION INTRAVENOUS CONTINUOUS PRN
Status: DISCONTINUED | OUTPATIENT
Start: 2018-08-23 | End: 2018-08-23 | Stop reason: SURG

## 2018-08-23 RX ADMIN — PROPOFOL 50 MG: 10 INJECTION, EMULSION INTRAVENOUS at 08:12

## 2018-08-23 RX ADMIN — PROPOFOL 50 MG: 10 INJECTION, EMULSION INTRAVENOUS at 08:35

## 2018-08-23 RX ADMIN — PROPOFOL 50 MG: 10 INJECTION, EMULSION INTRAVENOUS at 08:17

## 2018-08-23 RX ADMIN — PROPOFOL 50 MG: 10 INJECTION, EMULSION INTRAVENOUS at 08:25

## 2018-08-23 RX ADMIN — PROPOFOL 50 MG: 10 INJECTION, EMULSION INTRAVENOUS at 08:28

## 2018-08-23 RX ADMIN — PROPOFOL 50 MG: 10 INJECTION, EMULSION INTRAVENOUS at 08:15

## 2018-08-23 RX ADMIN — PROPOFOL 50 MG: 10 INJECTION, EMULSION INTRAVENOUS at 08:31

## 2018-08-23 RX ADMIN — PROPOFOL 50 MG: 10 INJECTION, EMULSION INTRAVENOUS at 08:22

## 2018-08-23 RX ADMIN — SODIUM CHLORIDE: 0.9 INJECTION, SOLUTION INTRAVENOUS at 08:03

## 2018-08-23 RX ADMIN — PROPOFOL 50 MG: 10 INJECTION, EMULSION INTRAVENOUS at 08:19

## 2018-08-23 RX ADMIN — PROPOFOL 100 MG: 10 INJECTION, EMULSION INTRAVENOUS at 08:08

## 2018-08-23 NOTE — ANESTHESIA PREPROCEDURE EVALUATION
Review of Systems/Medical History          Cardiovascular  Hyperlipidemia,    Pulmonary       GI/Hepatic    Bowel prep            Endo/Other     GYN       Hematology   Musculoskeletal       Neurology   Psychology           Physical Exam    Airway    Mallampati score: II         Dental   No notable dental hx     Cardiovascular      Pulmonary      Other Findings        Anesthesia Plan  ASA Score- 2     Anesthesia Type- IV sedation with anesthesia with ASA Monitors  Additional Monitors:   Airway Plan:     Comment: I, Dr Torin Monson, the attending physician, have personally seen and evaluated the patient prior to anesthetic care  I have reviewed the pre-anesthetic record, and other medical records if appropriate to the anesthetic care  If a CRNA is involved in the case, I have reviewed the CRNA assessment, if present, and agree  The patient is in a suitable condition to proceed with my formulated anesthetic plan        Plan Factors-    Induction- intravenous  Postoperative Plan-     Informed Consent- Anesthetic plan and risks discussed with patient  I personally reviewed this patient with the CRNA  Discussed and agreed on the Anesthesia Plan with the CRNA  Reynaldo Shell

## 2018-08-23 NOTE — ANESTHESIA POSTPROCEDURE EVALUATION
Post-Op Assessment Note      CV Status:  Stable    Mental Status:  Alert and awake    Hydration Status:  Stable and euvolemic    PONV Controlled:  Controlled    Airway Patency:  Patent and adequate    Post Op Vitals Reviewed: Yes          Staff: CRNA           /76 (08/23/18 0840)    Temp     Pulse 60 (08/23/18 0840)   Resp 20 (08/23/18 0840)    SpO2 100 % (08/23/18 0840)

## 2018-08-23 NOTE — OP NOTE
Colonoscopy Procedure Note    Procedure: Colonoscopy    Sedation: Monitored anesthesia care, check anesthesia records      ASA Class: 2    INDICATIONS:  Screening colonoscopy    POST-OP DIAGNOSIS: See the impression below    Procedure Details     Prior colonoscopy: No prior colonoscopy  Informed consent was obtained for the procedure, including sedation  Risks of perforation, hemorrhage, adverse drug reaction and aspiration were discussed  The patient was placed in the left lateral decubitus position  Based on the pre-procedure assessment, including review of the patient's medical history, medications, allergies, and review of systems, she had been deemed to be an appropriate candidate for conscious sedation; she was therefore sedated with the medications listed below  The patient was monitored continuously with telemetry, pulse oximetry, blood pressure monitoring, and direct observations  A rectal examination was performed  The colonoscope was inserted into the rectum and advanced under direct vision to the cecum, which was identified by the ileocecal valve and appendiceal orifice  The quality of the colonic preparation was good  A careful inspection was made as the colonoscope was withdrawn, including a retroflexed view of the rectum; findings and interventions are described below  Findings:    After extensive irrigation with approximately 2 L of water, patient had a good prep and a clean colonoscopy  Normal examination with good visualization  Normal retroflexion           Complications: None; patient tolerated the procedure well  Impression: We were able to achieve a good prep after extensive irrigation  Otherwise normal colonoscopy with good prep good visualization    Recommendations:  Repeat colonoscopy in 10 years or sooner if clinically indicated      Discharge home  Resume regular diet  Resume home medications  Repeat colonoscopy in 10 years  Call with any abdominal pain, bleeding, fevers

## 2018-08-23 NOTE — DISCHARGE INSTRUCTIONS
Colonoscopy   WHAT YOU NEED TO KNOW:   A colonoscopy is a procedure to examine the inside of your colon (intestine) with a scope  Polyps or tissue growths may have been removed during your colonoscopy  It is normal to feel bloated and to have some abdominal discomfort  You should be passing gas  If you have hemorrhoids or you had polyps removed, you may have a small amount of bleeding  DISCHARGE INSTRUCTIONS:   Seek care immediately if:   · You have a large amount of bright red blood in your bowel movements  · Your abdomen is hard and firm and you have severe pain  · You have sudden trouble breathing  Contact your healthcare provider if:   · You develop a rash or hives  · You have a fever within 24 hours of your procedure       · You have not had a bowel movement for 3 days after your procedure  · You have questions or concerns about your condition or care  Activity:   · Do not lift, strain, or run  for 3 days after your procedure  · Rest after your procedure  You have been given medicine to relax you  Do not  drive or make important decisions until the day after your procedure  Return to your normal activity as directed  · Relieve gas and discomfort from bloating  by lying on your right side with a heating pad on your abdomen  You may need to take short walks to help the gas move out  Eat small meals until bloating is relieved  If you had polyps removed: For 7 days after your procedure:  · Do not  take aspirin  · Do not  go on long car rides  Follow up with your healthcare provider as directed:  Write down your questions so you remember to ask them during your visits  © 2017 [employer] is for End User's use only and may not be sold, redistributed or otherwise used for commercial purposes  All illustrations and images included in CareNotes® are the copyrighted property of [employer]  or [employer]    The above information is an  only  It is not intended as medical advice for individual conditions or treatments  Talk to your doctor, nurse or pharmacist before following any medical regimen to see if it is safe and effective for you    Discharge home  Resume regular diet  Resume home medications  Repeat colonoscopy in 10 years  Call with any abdominal pain, bleeding, fevers

## 2018-08-23 NOTE — H&P
History and Physical - [facility]  Rodolfo Cueva 61 y o  female MRN: [id number]    HPI: Rodolfo Cueva is a 61y o  year old female who presents for screening colonoscopy  Review of Systems    Historical Information   Past Medical History:   Diagnosis Date    Nonadherence to medical treatment     No show for 2 pelvic floor PT appt     Past Surgical History:   Procedure Laterality Date    TUBAL LIGATION       Social History   History   Alcohol Use    Yes     Comment: occassional use     History   Drug Use No     History   Smoking Status    Never Smoker   Smokeless Tobacco    Never Used     Family History   Problem Relation Age of Onset    Heart attack Father 72    No Known Problems Mother     No Known Problems Sister     No Known Problems Brother     No Known Problems Daughter     No Known Problems Son     No Known Problems Sister     No Known Problems Sister        Meds/Allergies     Prescriptions Prior to Admission   Medication    cetirizine (ZyrTEC) 10 mg tablet    dextromethorphan-guaifenesin (MUCINEX DM)  MG per 12 hr tablet    fluticasone (FLONASE) 50 mcg/act nasal spray       Allergies   Allergen Reactions    Codeine Other (See Comments)     Abdomen problems       Objective     Blood pressure 143/79, pulse 85, temperature (!) 97 1 °F (36 2 °C), temperature source Temporal, resp  rate 16, height 5' 4" (1 626 m), weight 54 4 kg (120 lb), SpO2 99 %, not currently breastfeeding  PHYSICAL EXAM    Gen: NAD  CV: RRR  CHEST: Clear  ABD: soft, NT/ND  EXT: no edema  Neuro: AAO      ASSESSMENT/PLAN:  This is a 61y o  year old female here for screening colonoscopy       PLAN:   Procedure: colonoscopy

## 2018-12-05 ENCOUNTER — OFFICE VISIT (OUTPATIENT)
Dept: INTERNAL MEDICINE CLINIC | Facility: CLINIC | Age: 58
End: 2018-12-05
Payer: COMMERCIAL

## 2018-12-05 VITALS
HEART RATE: 77 BPM | SYSTOLIC BLOOD PRESSURE: 114 MMHG | TEMPERATURE: 99.1 F | WEIGHT: 123.5 LBS | BODY MASS INDEX: 21.08 KG/M2 | DIASTOLIC BLOOD PRESSURE: 82 MMHG | OXYGEN SATURATION: 99 % | HEIGHT: 64 IN

## 2018-12-05 DIAGNOSIS — J30.1 SEASONAL ALLERGIC RHINITIS DUE TO POLLEN: ICD-10-CM

## 2018-12-05 DIAGNOSIS — J01.91 ACUTE RECURRENT SINUSITIS, UNSPECIFIED LOCATION: ICD-10-CM

## 2018-12-05 DIAGNOSIS — J06.9 UPPER RESPIRATORY TRACT INFECTION, UNSPECIFIED TYPE: Primary | ICD-10-CM

## 2018-12-05 PROCEDURE — 3008F BODY MASS INDEX DOCD: CPT | Performed by: NURSE PRACTITIONER

## 2018-12-05 PROCEDURE — 99213 OFFICE O/P EST LOW 20 MIN: CPT | Performed by: NURSE PRACTITIONER

## 2018-12-05 RX ORDER — AMOXICILLIN AND CLAVULANATE POTASSIUM 875; 125 MG/1; MG/1
1 TABLET, FILM COATED ORAL EVERY 12 HOURS SCHEDULED
Qty: 14 TABLET | Refills: 0 | Status: SHIPPED | OUTPATIENT
Start: 2018-12-05 | End: 2018-12-12

## 2018-12-05 RX ORDER — FLUTICASONE PROPIONATE 50 MCG
2 SPRAY, SUSPENSION (ML) NASAL DAILY
Qty: 16 G | Refills: 0 | Status: SHIPPED | OUTPATIENT
Start: 2018-12-05

## 2018-12-05 NOTE — PROGRESS NOTES
Assessment/Plan:    Upper respiratory tract infection  Prescription sent in for Augmentin x7 days  Patient is also given a prescription for Flonase to use twice daily and Mucinex DM twice daily  We did also discuss limiting dairy, as dairy can increase thickness and quantity of mucus production  Patient should call the office if symptoms worsen or persist        Diagnoses and all orders for this visit:    Upper respiratory tract infection, unspecified type  -     dextromethorphan-guaifenesin (MUCINEX DM)  MG per 12 hr tablet; Take 1 tablet by mouth 2 (two) times a day as needed for cough    Seasonal allergic rhinitis due to pollen  -     fluticasone (FLONASE) 50 mcg/act nasal spray; 2 sprays into each nostril daily    Acute recurrent sinusitis, unspecified location  -     amoxicillin-clavulanate (AUGMENTIN) 875-125 mg per tablet; Take 1 tablet by mouth every 12 (twelve) hours for 7 days          Subjective:      Patient ID: Tomer Viera is a 60 y o  female  Pt is a 60 y o  y/o female who is seen today for evaluation of sinus congestion and mucus production  Patient states that mucus production and has been ongoing all season and is very thick and she says it sits in her throat  N[place]ie [place] She has frontal and maxillary sinus pressure  She has been taking zyrtec D intermittently with very little improvement  She denies fever/chills, cough, sore throat, and shortness of breath  The following portions of the patient's history were reviewed and updated as appropriate: allergies, current medications, past family history, past medical history, past social history, past surgical history and problem list     Review of Systems   Constitutional: Negative for appetite change, chills, fatigue and fever  HENT: Positive for congestion, rhinorrhea and sinus pressure  Negative for ear pain and postnasal drip  Thick mucus production    See HPI   Respiratory: Negative for cough, chest tightness, shortness of breath and wheezing  Cardiovascular: Negative for chest pain, palpitations and leg swelling  Gastrointestinal: Negative for diarrhea, nausea and vomiting  Genitourinary: Negative for dysuria  Musculoskeletal: Negative for myalgias  Neurological: Positive for headaches  Negative for dizziness  Hematological: Negative for adenopathy  Psychiatric/Behavioral: Negative for sleep disturbance  Objective:      /82 (BP Location: Left arm, Patient Position: Sitting, Cuff Size: Adult)   Pulse 77   Temp 99 1 °F (37 3 °C) (Tympanic)   Ht 5' 4" (1 626 m)   Wt 56 kg (123 lb 8 oz)   SpO2 99%   BMI 21 20 kg/m²          Physical Exam   Constitutional: She is oriented to person, place, and time  Vital signs are normal  She appears well-developed and well-nourished  She is cooperative  HENT:   Right Ear: Hearing, external ear and ear canal normal  Tympanic membrane is not bulging  A middle ear effusion is present  Left Ear: Hearing, tympanic membrane, external ear and ear canal normal  Tympanic membrane is not bulging  No middle ear effusion  Nose: Mucosal edema present  No rhinorrhea  Mouth/Throat: Mucous membranes are normal  Mucous membranes are not dry  Clear fluid visible behind the right tympanic membrane  Swelling and erythema of nasal turbinates  Unable to visualize the posterior or oropharynx  Eyes: Conjunctivae are normal    Cardiovascular: Normal rate, regular rhythm, normal heart sounds and intact distal pulses  No murmur heard  Pulmonary/Chest: Effort normal and breath sounds normal  No accessory muscle usage  No respiratory distress  She has no decreased breath sounds  She has no wheezes  She has no rhonchi  She has no rales  Musculoskeletal: She exhibits no edema  Lymphadenopathy:        Head (right side): No submental, no submandibular, no tonsillar, no preauricular, no posterior auricular and no occipital adenopathy present  Head (left side):  No submental, no submandibular, no tonsillar, no preauricular, no posterior auricular and no occipital adenopathy present  She has no cervical adenopathy  Neurological: She is alert and oriented to person, place, and time  Skin: Skin is warm, dry and intact  Psychiatric: She has a normal mood and affect  Her speech is normal and behavior is normal  Judgment and thought content normal  Cognition and memory are normal    Vitals reviewed

## 2018-12-05 NOTE — ASSESSMENT & PLAN NOTE
Prescription sent in for Augmentin x7 days  Patient is also given a prescription for Flonase to use twice daily and Mucinex DM twice daily  We did also discuss limiting dairy, as dairy can increase thickness and quantity of mucus production    Patient should call the office if symptoms worsen or persist

## 2019-01-22 ENCOUNTER — TELEPHONE (OUTPATIENT)
Dept: INTERNAL MEDICINE CLINIC | Facility: CLINIC | Age: 59
End: 2019-01-22

## 2019-02-20 ENCOUNTER — TELEPHONE (OUTPATIENT)
Dept: INTERNAL MEDICINE CLINIC | Facility: CLINIC | Age: 59
End: 2019-02-20

## 2019-02-20 NOTE — TELEPHONE ENCOUNTER
Called patient for follow up and she requested blood work before her appointment due to her work schedule  Will call patient back when orders are in and she will schedule an appointment

## 2019-04-05 DIAGNOSIS — N30.00 ACUTE CYSTITIS WITHOUT HEMATURIA: Primary | ICD-10-CM

## 2019-04-05 RX ORDER — CEPHALEXIN 500 MG/1
500 CAPSULE ORAL EVERY 24 HOURS
Qty: 30 CAPSULE | Refills: 3 | Status: SHIPPED | OUTPATIENT
Start: 2019-04-05 | End: 2019-05-05

## 2019-05-07 ENCOUNTER — ANNUAL EXAM (OUTPATIENT)
Dept: GYNECOLOGY | Facility: CLINIC | Age: 59
End: 2019-05-07
Payer: COMMERCIAL

## 2019-05-07 ENCOUNTER — HOSPITAL ENCOUNTER (OUTPATIENT)
Dept: RADIOLOGY | Age: 59
Discharge: HOME | End: 2019-05-07
Payer: COMMERCIAL

## 2019-05-07 VITALS
HEIGHT: 65 IN | WEIGHT: 122.2 LBS | DIASTOLIC BLOOD PRESSURE: 76 MMHG | SYSTOLIC BLOOD PRESSURE: 132 MMHG | BODY MASS INDEX: 20.36 KG/M2 | HEART RATE: 64 BPM

## 2019-05-07 VITALS — BODY MASS INDEX: 19.99 KG/M2 | WEIGHT: 120 LBS | HEIGHT: 65 IN

## 2019-05-07 DIAGNOSIS — N95.2 VAGINAL ATROPHY: ICD-10-CM

## 2019-05-07 DIAGNOSIS — Z01.411 ENCNTR FOR GYN EXAM (GENERAL) (ROUTINE) W ABNORMAL FINDINGS: Primary | ICD-10-CM

## 2019-05-07 DIAGNOSIS — Z12.31 ENCOUNTER FOR SCREENING MAMMOGRAM FOR MALIGNANT NEOPLASM OF BREAST: ICD-10-CM

## 2019-05-07 DIAGNOSIS — Z12.31 ENCOUNTER FOR SCREENING MAMMOGRAM FOR BREAST CANCER: ICD-10-CM

## 2019-05-07 PROCEDURE — 77063 BREAST TOMOSYNTHESIS BI: CPT

## 2019-05-07 PROCEDURE — S0612 ANNUAL GYNECOLOGICAL EXAMINA: HCPCS | Performed by: NURSE PRACTITIONER

## 2019-05-07 PROCEDURE — 77067 SCR MAMMO BI INCL CAD: CPT

## 2020-08-12 NOTE — PROGRESS NOTES
Assessment/Plan:  NGE                                                                                                                   Co- Testing q 5 yrs  '23                                                                               RTO 1 yr  SBA monthly  3 D Mammography   Colonoscopy- '18  Exercise 3/wk                  Calcium 1,000 mg/d with Vit D     Depression Screen: Neg       Diagnoses and all orders for this visit:    Encntr for gyn exam (general) (routine) w abnormal findings    Encounter for screening mammogram for breast cancer  -     Mammo screening bilateral w 3d & cad; Future    Vaginal atrophy              Subjective:        Patient ID: Gabriel Peralta is a 60 y o  female  Mrs Naya Peck is here for an annual visit  She has no complaints  She is sexually active  Sometimes she uses a lubricant  She denies any vaginal bleeding  The following portions of the patient's history were reviewed and updated as appropriate: She  has a past medical history of Nonadherence to medical treatment  Patient Active Problem List    Diagnosis Date Noted    Encntr for gyn exam (general) (routine) w abnormal findings 05/07/2019    Vaginal atrophy 05/07/2019    Encounter for screening mammogram for breast cancer 05/07/2019    Colon cancer screening 07/27/2018    Upper respiratory tract infection 07/26/2018    Vitamin D deficiency 10/16/2015    Fatigue 12/09/2014    Hyperlipidemia 12/09/2014   PMH:  AV- Pelvic PT '18  She  has a past surgical history that includes Tubal ligation; pr colonoscopy flx dx w/collj spec when pfrmd (N/A, 8/23/2018); and [place] tooth extraction  Her family history includes Heart attack (age of onset: 72) in her father; No Known Problems in her brother, daughter, mother, sister, sister, sister, and son  FH:  M- d Covid 89 5/20  MA- Covid 89, survived/hospitalized 5 days    She  reports that she has never smoked  She has never used smokeless tobacco  She reports current alcohol use  She reports that she does not use drugs  SH      for [employer] in the [employer]  Current Outpatient Medications   Medication Sig Dispense Refill    cetirizine (ZyrTEC) 10 mg tablet Take 10 mg by mouth daily      dextromethorphan-guaifenesin (MUCINEX DM)  MG per 12 hr tablet Take 1 tablet by mouth 2 (two) times a day as needed for cough 30 tablet 0    fluticasone (FLONASE) 50 mcg/act nasal spray 2 sprays into each nostril daily 16 g 0     No current facility-administered medications for this visit  Current Outpatient Medications on File Prior to Visit   Medication Sig    cetirizine (ZyrTEC) 10 mg tablet Take 10 mg by mouth daily    dextromethorphan-guaifenesin (MUCINEX DM)  MG per 12 hr tablet Take 1 tablet by mouth 2 (two) times a day as needed for cough    fluticasone (FLONASE) 50 mcg/act nasal spray 2 sprays into each nostril daily     No current facility-administered medications on file prior to visit  She is allergic to codeine       Review of Systems   Constitutional: Negative for activity change, appetite change, chills, fatigue, fever and unexpected weight change  HENT: Negative for congestion, rhinorrhea, sinus pressure, sore throat and trouble swallowing  Eyes: Negative for discharge, redness, itching and visual disturbance  Respiratory: Negative for cough, chest tightness, shortness of breath and wheezing  Cardiovascular: Negative for chest pain, palpitations and leg swelling  Gastrointestinal: Negative for abdominal distention, abdominal pain, blood in stool, constipation, diarrhea, nausea and vomiting  Genitourinary: Negative for decreased urine volume, difficulty urinating, dyspareunia, dysuria, frequency, hematuria, menstrual problem, pelvic pain, urgency, vaginal bleeding, vaginal discharge and vaginal pain  [place] once a week     Sometimes she uses a lubricant  Musculoskeletal: Positive for arthralgias (Index and middle finger of the right hand  )  Skin: Negative for rash  Neurological: Negative for weakness, light-headedness, numbness and headaches  Hematological: Does not bruise/bleed easily  Psychiatric/Behavioral: Negative for agitation, behavioral problems and sleep disturbance  The patient is not nervous/anxious  Objective: There were no vitals filed for this visit  Physical Exam  Vitals signs and nursing note reviewed  Constitutional:       Appearance: She is well-developed  HENT:      Head: Normocephalic and atraumatic  Eyes:      Conjunctiva/sclera: Conjunctivae normal       Pupils: Pupils are equal, round, and reactive to light  Neck:      Musculoskeletal: Normal range of motion and neck supple  Thyroid: No thyromegaly  Trachea: No tracheal deviation  Cardiovascular:      Rate and Rhythm: Normal rate and regular rhythm  Heart sounds: Normal heart sounds  No murmur  Pulmonary:      Effort: Pulmonary effort is normal  No respiratory distress  Breath sounds: Normal breath sounds  No wheezing  Chest:      Breasts: Breasts are symmetrical          Right: No inverted nipple, mass, nipple discharge, skin change or tenderness  Left: No inverted nipple, mass, nipple discharge, skin change or tenderness  Abdominal:      General: Bowel sounds are normal  There is no distension  Palpations: Abdomen is soft  There is no mass  Tenderness: There is no abdominal tenderness  Genitourinary:     Labia:         Right: No rash, tenderness or lesion  Left: No rash, tenderness or lesion  Vagina: Normal       Cervix: No cervical motion tenderness or discharge  Uterus: Not deviated, not enlarged and not tender  Adnexa:         Right: No mass, tenderness or fullness  Left: No mass, tenderness or fullness  Rectum: No external hemorrhoid  Comments: Urethral meatus within normal limits  Perineum within normal limits  Bladder well supported  Musculoskeletal: Normal range of motion  Skin:     General: Skin is warm and dry  Neurological:      Mental Status: She is alert and oriented to person, place, and time  Psychiatric:         Behavior: Behavior normal          Thought Content:  Thought content normal          Judgment: Judgment normal

## 2020-08-13 ENCOUNTER — ANNUAL EXAM (OUTPATIENT)
Dept: GYNECOLOGY | Facility: CLINIC | Age: 60
End: 2020-08-13
Payer: COMMERCIAL

## 2020-08-13 VITALS
SYSTOLIC BLOOD PRESSURE: 108 MMHG | TEMPERATURE: 98.1 F | DIASTOLIC BLOOD PRESSURE: 70 MMHG | BODY MASS INDEX: 19.96 KG/M2 | WEIGHT: 119.8 LBS | HEIGHT: 65 IN

## 2020-08-13 DIAGNOSIS — Z12.31 ENCOUNTER FOR SCREENING MAMMOGRAM FOR BREAST CANCER: ICD-10-CM

## 2020-08-13 DIAGNOSIS — Z01.411 ENCNTR FOR GYN EXAM (GENERAL) (ROUTINE) W ABNORMAL FINDINGS: Primary | ICD-10-CM

## 2020-08-13 DIAGNOSIS — N95.2 VAGINAL ATROPHY: ICD-10-CM

## 2020-08-13 PROCEDURE — 3008F BODY MASS INDEX DOCD: CPT | Performed by: OBSTETRICS & GYNECOLOGY

## 2020-08-13 PROCEDURE — S0612 ANNUAL GYNECOLOGICAL EXAMINA: HCPCS | Performed by: OBSTETRICS & GYNECOLOGY

## 2021-01-01 NOTE — PROGRESS NOTES
Assessment/Plan:      Diagnoses and all orders for this visit:    Seasonal allergic rhinitis due to pollen  -     fluticasone (FLONASE) 50 mcg/act nasal spray; 2 sprays into each nostril daily    Upper respiratory tract infection, unspecified type          Subjective:     Patient ID: Jacinto Tyler is a 61 y o  female  Patient is a 50-year-old female who is being seen today in the office for re-evaluation of an upper respiratory tract infection/sinusitis/allergic rhinitis, E station tube dysfunction        Review of Systems   Constitutional: Negative  HENT: Positive for congestion and postnasal drip  Negative for dental problem, drooling, ear discharge, ear pain, facial swelling, hearing loss, mouth sores, nosebleeds, rhinorrhea, sinus pain, sinus pressure, sneezing, sore throat, tinnitus and trouble swallowing  Eyes: Negative  Respiratory: Negative  Cardiovascular: Negative  Gastrointestinal: Negative  Endocrine: Negative  Genitourinary: Negative  Musculoskeletal: Negative  Skin: Negative  Allergic/Immunologic: Negative  Neurological: Negative  Hematological: Negative  Psychiatric/Behavioral: Negative  Objective:     Physical Exam   Constitutional: She is oriented to person, place, and time  She appears well-developed and well-nourished  HENT:   Head: Normocephalic and atraumatic  Right Ear: External ear normal    Left Ear: External ear normal    Mouth/Throat: Oropharyngeal exudate present  Patient on evaluation has slightly enlarged written nasal turbinates bilaterally consistent with her allergic rhinitis, slight erythema, clear nasal discharge, erythema to posterior airway with a thick whitish postnasal drip, patient has no cervical adenopathy   Eyes: Conjunctivae and EOM are normal  Pupils are equal, round, and reactive to light  Neck: Normal range of motion  Neck supple  No JVD present  No tracheal deviation present  No thyromegaly present  Cardiovascular: Normal rate, regular rhythm, normal heart sounds and intact distal pulses  Pulmonary/Chest: Effort normal and breath sounds normal    Abdominal: Soft  Bowel sounds are normal    Musculoskeletal: Normal range of motion  Lymphadenopathy:     She has no cervical adenopathy  Neurological: She is alert and oriented to person, place, and time  She has normal reflexes  Skin: Skin is warm and dry  Psychiatric: She has a normal mood and affect  Her behavior is normal  Judgment and thought content normal    Nursing note and vitals reviewed  [email or web address]

## 2021-01-07 ENCOUNTER — TELEPHONE (OUTPATIENT)
Dept: INTERNAL MEDICINE CLINIC | Facility: CLINIC | Age: 61
End: 2021-01-07

## 2021-01-07 DIAGNOSIS — E55.9 VITAMIN D DEFICIENCY: Primary | ICD-10-CM

## 2021-01-07 DIAGNOSIS — Z00.00 HEALTHCARE MAINTENANCE: ICD-10-CM

## 2021-01-07 DIAGNOSIS — R53.83 FATIGUE, UNSPECIFIED TYPE: ICD-10-CM

## 2021-01-07 DIAGNOSIS — E78.01 FAMILIAL HYPERCHOLESTEROLEMIA: ICD-10-CM

## 2021-01-07 DIAGNOSIS — Z12.11 COLON CANCER SCREENING: ICD-10-CM

## 2021-01-07 NOTE — TELEPHONE ENCOUNTER
Pt wanted to know if you want any labs done before she comes in for her preop visit  Pt states it has been a long time since she has had labs done

## 2021-02-04 ENCOUNTER — LAB (OUTPATIENT)
Dept: LAB | Facility: CLINIC | Age: 61
End: 2021-02-04
Payer: COMMERCIAL

## 2021-02-04 DIAGNOSIS — E55.9 VITAMIN D DEFICIENCY: ICD-10-CM

## 2021-02-04 DIAGNOSIS — E78.01 FAMILIAL HYPERCHOLESTEROLEMIA: ICD-10-CM

## 2021-02-04 DIAGNOSIS — Z01.818 OTHER SPECIFIED PRE-OPERATIVE EXAMINATION: Primary | ICD-10-CM

## 2021-02-04 DIAGNOSIS — Z00.00 HEALTHCARE MAINTENANCE: ICD-10-CM

## 2021-02-04 DIAGNOSIS — R53.83 FATIGUE, UNSPECIFIED TYPE: ICD-10-CM

## 2021-02-04 LAB
25(OH)D3 SERPL-MCNC: 17.5 NG/ML (ref 30–100)
ALBUMIN SERPL BCP-MCNC: 4.6 G/DL (ref 3.5–5)
ALP SERPL-CCNC: 69 U/L (ref 46–116)
ALT SERPL W P-5'-P-CCNC: 30 U/L (ref 12–78)
ANION GAP SERPL CALCULATED.3IONS-SCNC: 3 MMOL/L (ref 4–13)
AST SERPL W P-5'-P-CCNC: 25 U/L (ref 5–45)
BASOPHILS # BLD AUTO: 0.03 THOUSANDS/ΜL (ref 0–0.1)
BASOPHILS NFR BLD AUTO: 1 % (ref 0–1)
BILIRUB SERPL-MCNC: 0.73 MG/DL (ref 0.2–1)
BILIRUB UR QL STRIP: NEGATIVE
BUN SERPL-MCNC: 24 MG/DL (ref 5–25)
CALCIUM SERPL-MCNC: 9.8 MG/DL (ref 8.3–10.1)
CHLORIDE SERPL-SCNC: 106 MMOL/L (ref 100–108)
CHOLEST SERPL-MCNC: 235 MG/DL (ref 50–200)
CLARITY UR: NORMAL
CO2 SERPL-SCNC: 31 MMOL/L (ref 21–32)
COLOR UR: YELLOW
CREAT SERPL-MCNC: 0.82 MG/DL (ref 0.6–1.3)
EOSINOPHIL # BLD AUTO: 0.08 THOUSAND/ΜL (ref 0–0.61)
EOSINOPHIL NFR BLD AUTO: 2 % (ref 0–6)
ERYTHROCYTE [DISTWIDTH] IN BLOOD BY AUTOMATED COUNT: 12 % (ref 11.6–15.1)
GFR SERPL CREATININE-BSD FRML MDRD: 77 ML/MIN/1.73SQ M
GLUCOSE P FAST SERPL-MCNC: 95 MG/DL (ref 65–99)
GLUCOSE UR STRIP-MCNC: NEGATIVE MG/DL
HCT VFR BLD AUTO: 46.9 % (ref 34.8–46.1)
HDLC SERPL-MCNC: 113 MG/DL
HGB BLD-MCNC: 15.7 G/DL (ref 11.5–15.4)
HGB UR QL STRIP.AUTO: NEGATIVE
IMM GRANULOCYTES # BLD AUTO: 0.01 THOUSAND/UL (ref 0–0.2)
IMM GRANULOCYTES NFR BLD AUTO: 0 % (ref 0–2)
KETONES UR STRIP-MCNC: NEGATIVE MG/DL
LDLC SERPL CALC-MCNC: 104 MG/DL (ref 0–100)
LEUKOCYTE ESTERASE UR QL STRIP: NEGATIVE
LYMPHOCYTES # BLD AUTO: 0.91 THOUSANDS/ΜL (ref 0.6–4.47)
LYMPHOCYTES NFR BLD AUTO: 21 % (ref 14–44)
MCH RBC QN AUTO: 30.5 PG (ref 26.8–34.3)
MCHC RBC AUTO-ENTMCNC: 33.5 G/DL (ref 31.4–37.4)
MCV RBC AUTO: 91 FL (ref 82–98)
MONOCYTES # BLD AUTO: 0.31 THOUSAND/ΜL (ref 0.17–1.22)
MONOCYTES NFR BLD AUTO: 7 % (ref 4–12)
NEUTROPHILS # BLD AUTO: 3.01 THOUSANDS/ΜL (ref 1.85–7.62)
NEUTS SEG NFR BLD AUTO: 69 % (ref 43–75)
NITRITE UR QL STRIP: NEGATIVE
NONHDLC SERPL-MCNC: 122 MG/DL
NRBC BLD AUTO-RTO: 0 /100 WBCS
PH UR STRIP.AUTO: 6 [PH]
PLATELET # BLD AUTO: 199 THOUSANDS/UL (ref 149–390)
PMV BLD AUTO: 10 FL (ref 8.9–12.7)
POTASSIUM SERPL-SCNC: 4.1 MMOL/L (ref 3.5–5.3)
PROT SERPL-MCNC: 7.1 G/DL (ref 6.4–8.2)
PROT UR STRIP-MCNC: NEGATIVE MG/DL
RBC # BLD AUTO: 5.15 MILLION/UL (ref 3.81–5.12)
SODIUM SERPL-SCNC: 140 MMOL/L (ref 136–145)
SP GR UR STRIP.AUTO: 1.02 (ref 1–1.03)
TRIGL SERPL-MCNC: 91 MG/DL
TSH SERPL DL<=0.05 MIU/L-ACNC: 1.23 UIU/ML (ref 0.36–3.74)
UROBILINOGEN UR QL STRIP.AUTO: 0.2 E.U./DL
WBC # BLD AUTO: 4.35 THOUSAND/UL (ref 4.31–10.16)

## 2021-02-04 PROCEDURE — 84443 ASSAY THYROID STIM HORMONE: CPT

## 2021-02-04 PROCEDURE — 82306 VITAMIN D 25 HYDROXY: CPT

## 2021-02-04 PROCEDURE — 80061 LIPID PANEL: CPT

## 2021-02-04 PROCEDURE — 80053 COMPREHEN METABOLIC PANEL: CPT

## 2021-02-04 PROCEDURE — 85025 COMPLETE CBC W/AUTO DIFF WBC: CPT

## 2021-02-04 PROCEDURE — 81003 URINALYSIS AUTO W/O SCOPE: CPT

## 2021-02-04 PROCEDURE — 36415 COLL VENOUS BLD VENIPUNCTURE: CPT

## 2021-02-10 ENCOUNTER — OFFICE VISIT (OUTPATIENT)
Dept: INTERNAL MEDICINE CLINIC | Facility: CLINIC | Age: 61
End: 2021-02-10
Payer: COMMERCIAL

## 2021-02-10 VITALS
DIASTOLIC BLOOD PRESSURE: 84 MMHG | OXYGEN SATURATION: 99 % | HEART RATE: 79 BPM | HEIGHT: 65 IN | WEIGHT: 120 LBS | TEMPERATURE: 97.6 F | SYSTOLIC BLOOD PRESSURE: 136 MMHG | BODY MASS INDEX: 19.99 KG/M2

## 2021-02-10 DIAGNOSIS — E55.9 VITAMIN D DEFICIENCY: ICD-10-CM

## 2021-02-10 DIAGNOSIS — Z01.818 PREOP EXAMINATION: Primary | ICD-10-CM

## 2021-02-10 DIAGNOSIS — H02.89: ICD-10-CM

## 2021-02-10 DIAGNOSIS — Z00.00 HEALTHCARE MAINTENANCE: ICD-10-CM

## 2021-02-10 PROCEDURE — 99214 OFFICE O/P EST MOD 30 MIN: CPT | Performed by: INTERNAL MEDICINE

## 2021-02-10 PROCEDURE — 93000 ELECTROCARDIOGRAM COMPLETE: CPT | Performed by: INTERNAL MEDICINE

## 2021-02-10 PROCEDURE — 3725F SCREEN DEPRESSION PERFORMED: CPT | Performed by: INTERNAL MEDICINE

## 2021-02-10 RX ORDER — AMOXICILLIN 500 MG/1
TABLET, FILM COATED ORAL
COMMUNITY
Start: 2021-02-03 | End: 2021-02-17

## 2021-02-10 RX ORDER — VALACYCLOVIR HYDROCHLORIDE 1 G/1
1000 TABLET, FILM COATED ORAL
COMMUNITY
Start: 2021-02-08

## 2021-02-10 RX ORDER — NEOMYCIN SULFATE, POLYMYXIN B SULFATE, AND DEXAMETHASONE 3.5; 10000; 1 MG/G; [USP'U]/G; MG/G
OINTMENT OPHTHALMIC
COMMUNITY
Start: 2021-02-08 | End: 2021-02-17

## 2021-02-10 NOTE — PROGRESS NOTES
Assessment/Plan:    Eyelid concretions  Patient is here today for preop evaluation, states that she had plastic surgery to the eyelids approximately 8 years ago  She has a redundancy of tissue above her both eyelids and excessive tissue below her eyelids bilaterally  Will be going for surgical procedure which she states will be mostly laser treatment  They are asking for preop evaluation prior to the procedure  Patient did have an EKG performed in the office today which is essentially normal   Underwent physical exam today in the office showing a healthy young adult and there are no contraindications for the surgery as planned  We did review recent lab testing with the patient  Hyperlipidemia  All patient does have a history of hyperlipidemia but she does have a very high HDL cholesterol which compensates for this  Patient was still instructed to watch her diet closely as far as intake of fats and cholesterol  Continue with her routine exercise program    Vitamin D deficiency  History of vitamin-D deficiency  She states that she is not taking supplements as previously prescribed and her vitamin-D level is low at 17 period we did discuss with her the importance of vitamin-D especially with her being postmenopausal in order to help with bone strength  She continues to exercise on a regular basis  We will check another vitamin-D level when she returns to the office in 6 months       Diagnoses and all orders for this visit:    Healthcare maintenance    Eyelid concretions    Vitamin D deficiency    Other orders  -     valACYclovir (VALTREX) 1,000 mg tablet  -     neomycin-polymyxin-dexamethasone (MAXITROL) 0 35%-10,000 units/g-0 1%  -     amoxicillin (AMOXIL) 500 MG tablet  -     POCT ECG          Subjective:      Patient ID: Nicole Carrasco is a 63 y o  female  Patient is a 69-year-old female history of medical problems as outlined previously  Patient is here today for preop evaluation    States that she is having difficulty with excessive tissue both upper and lower lids of both eyes and is to undergo surgical procedure which seems to be mostly laser treatment in the near future  Patient did have labs performed prior to the visit today we did discuss the results  She also had an EKG performed preoperatively  The following portions of the patient's history were reviewed and updated as appropriate: She  has a past medical history of Nonadherence to medical treatment  She   Patient Active Problem List    Diagnosis Date Noted    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    Encntr for gyn exam (general) (routine) w abnormal findings 05/07/2019    Vaginal atrophy 05/07/2019    Healthcare maintenance 05/07/2019    Colon cancer screening 07/27/2018    Upper respiratory tract infection 07/26/2018    Vitamin D deficiency 10/16/2015    Fatigue 12/09/2014    Hyperlipidemia 12/09/2014     She  has a past surgical history that includes Tubal ligation; pr colonoscopy flx dx w/collj spec when pfrmd (N/A, 8/23/2018); and [place] tooth extraction  Her family history includes Heart attack (age of onset: 72) in her father; No Known Problems in her brother, daughter, mother, sister, sister, sister, and son  She  reports that she has never smoked  She has never used smokeless tobacco  She reports previous alcohol use  She reports that she does not use drugs    Current Outpatient Medications   Medication Sig Dispense Refill    amoxicillin (AMOXIL) 500 MG tablet       cetirizine (ZyrTEC) 10 mg tablet Take 10 mg by mouth daily      dextromethorphan-guaifenesin (MUCINEX DM)  MG per 12 hr tablet Take 1 tablet by mouth 2 (two) times a day as needed for cough 30 tablet 0    fluticasone (FLONASE) 50 mcg/act nasal spray 2 sprays into each nostril daily 16 g 0    neomycin-polymyxin-dexamethasone (MAXITROL) 0 35%-10,000 units/g-0 1%       valACYclovir (VALTREX) 1,000 mg tablet        No current facility-administered medications for this visit  Current Outpatient Medications on File Prior to Visit   Medication Sig    amoxicillin (AMOXIL) 500 MG tablet     cetirizine (ZyrTEC) 10 mg tablet Take 10 mg by mouth daily    dextromethorphan-guaifenesin (MUCINEX DM)  MG per 12 hr tablet Take 1 tablet by mouth 2 (two) times a day as needed for cough    fluticasone (FLONASE) 50 mcg/act nasal spray 2 sprays into each nostril daily    neomycin-polymyxin-dexamethasone (MAXITROL) 0 35%-10,000 units/g-0 1%     valACYclovir (VALTREX) 1,000 mg tablet      No current facility-administered medications on file prior to visit  She is allergic to codeine       Review of Systems   Constitutional: Negative  HENT: Negative  Eyes: Negative for photophobia, pain, discharge, redness, itching and visual disturbance  Redundant tissue both upper lower eyelids   Respiratory: Negative  Cardiovascular: Negative  Gastrointestinal: Negative  Endocrine: Negative  Genitourinary: Negative  Musculoskeletal: Negative  Skin: Negative  Allergic/Immunologic: Negative  Neurological: Negative  Hematological: Negative  Psychiatric/Behavioral: Negative  Objective:      /84   Pulse 79   Temp 97 6 °F (36 4 °C) (Tympanic)   Ht 5' 4 5" (1 638 m)   Wt 54 4 kg (120 lb)   LMP  (LMP Unknown)   SpO2 99%   BMI 20 28 kg/m²          Physical Exam  Vitals signs and nursing note reviewed  Constitutional:       General: She is not in acute distress  Appearance: Normal appearance  She is normal weight  She is not ill-appearing, toxic-appearing or diaphoretic  Comments: l patient is a healthy appearing 59-year-old female who is awake alert no acute distress and oriented x3   HENT:      Head: Normocephalic and atraumatic  Right Ear: Tympanic membrane, ear canal and external ear normal  There is no impacted cerumen        Left Ear: Tympanic membrane, ear canal and external ear normal  There is no impacted cerumen  Nose: Nose normal  No congestion or rhinorrhea  Mouth/Throat:      Mouth: Mucous membranes are moist       Pharynx: Oropharynx is clear  No oropharyngeal exudate or posterior oropharyngeal erythema  Eyes:      General: No scleral icterus  Right eye: No discharge  Left eye: No discharge  Extraocular Movements: Extraocular movements intact  Conjunctiva/sclera: Conjunctivae normal       Pupils: Pupils are equal, round, and reactive to light  Comments: Redundant tissue to both upper lower eyelids bilaterally, entropion   Neck:      Musculoskeletal: Normal range of motion and neck supple  No neck rigidity or muscular tenderness  Vascular: No carotid bruit  Cardiovascular:      Rate and Rhythm: Normal rate and regular rhythm  Pulses: Normal pulses  Heart sounds: Normal heart sounds  No murmur  No friction rub  No gallop  Pulmonary:      Effort: Pulmonary effort is normal  No respiratory distress  Breath sounds: Normal breath sounds  No stridor  No wheezing, rhonchi or rales  Chest:      Chest wall: No tenderness  Abdominal:      General: Abdomen is flat  Bowel sounds are normal  There is no distension  Palpations: Abdomen is soft  There is no mass  Tenderness: There is no abdominal tenderness  There is no right CVA tenderness, left CVA tenderness, guarding or rebound  Hernia: No hernia is present  Musculoskeletal: Normal range of motion  General: No swelling, tenderness, deformity or signs of injury  Right lower leg: No edema  Left lower leg: No edema  Lymphadenopathy:      Cervical: No cervical adenopathy  Skin:     General: Skin is warm and dry  Coloration: Skin is not jaundiced or pale  Findings: No bruising, erythema, lesion or rash  Neurological:      General: No focal deficit present  Mental Status: She is alert and oriented to person, place, and time  Mental status is at baseline  Cranial Nerves: No cranial nerve deficit  Sensory: No sensory deficit  Motor: No weakness  Coordination: Coordination normal       Gait: Gait normal       Deep Tendon Reflexes: Reflexes normal    Psychiatric:         Mood and Affect: Mood normal          Behavior: Behavior normal          Thought Content:  Thought content normal          Judgment: Judgment normal

## 2021-02-10 NOTE — ASSESSMENT & PLAN NOTE
All patient does have a history of hyperlipidemia but she does have a very high HDL cholesterol which compensates for this  Patient was still instructed to watch her diet closely as far as intake of fats and cholesterol    Continue with her routine exercise program

## 2021-02-10 NOTE — ASSESSMENT & PLAN NOTE
History of vitamin-D deficiency  She states that she is not taking supplements as previously prescribed and her vitamin-D level is low at 17 period we did discuss with her the importance of vitamin-D especially with her being postmenopausal in order to help with bone strength  She continues to exercise on a regular basis    We will check another vitamin-D level when she returns to the office in 6 months

## 2021-02-10 NOTE — H&P (VIEW-ONLY)
Assessment/Plan:    Eyelid concretions  Patient is here today for preop evaluation, states that she had plastic surgery to the eyelids approximately 8 years ago  She has a redundancy of tissue above her both eyelids and excessive tissue below her eyelids bilaterally  Will be going for surgical procedure which she states will be mostly laser treatment  They are asking for preop evaluation prior to the procedure  Patient did have an EKG performed in the office today which is essentially normal   Underwent physical exam today in the office showing a healthy young adult and there are no contraindications for the surgery as planned  We did review recent lab testing with the patient  Hyperlipidemia  All patient does have a history of hyperlipidemia but she does have a very high HDL cholesterol which compensates for this  Patient was still instructed to watch her diet closely as far as intake of fats and cholesterol  Continue with her routine exercise program    Vitamin D deficiency  History of vitamin-D deficiency  She states that she is not taking supplements as previously prescribed and her vitamin-D level is low at 17 period we did discuss with her the importance of vitamin-D especially with her being postmenopausal in order to help with bone strength  She continues to exercise on a regular basis  We will check another vitamin-D level when she returns to the office in 6 months       Diagnoses and all orders for this visit:    Healthcare maintenance    Eyelid concretions    Vitamin D deficiency    Other orders  -     valACYclovir (VALTREX) 1,000 mg tablet  -     neomycin-polymyxin-dexamethasone (MAXITROL) 0 35%-10,000 units/g-0 1%  -     amoxicillin (AMOXIL) 500 MG tablet  -     POCT ECG          Subjective:      Patient ID: Aravind Zapata is a 63 y o  female  Patient is a 65-year-old female history of medical problems as outlined previously  Patient is here today for preop evaluation    States that she is having difficulty with excessive tissue both upper and lower lids of both eyes and is to undergo surgical procedure which seems to be mostly laser treatment in the near future  Patient did have labs performed prior to the visit today we did discuss the results  She also had an EKG performed preoperatively  The following portions of the patient's history were reviewed and updated as appropriate: She  has a past medical history of Nonadherence to medical treatment  She   Patient Active Problem List    Diagnosis Date Noted    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    Encntr for gyn exam (general) (routine) w abnormal findings 05/07/2019    Vaginal atrophy 05/07/2019    Healthcare maintenance 05/07/2019    Colon cancer screening 07/27/2018    Upper respiratory tract infection 07/26/2018    Vitamin D deficiency 10/16/2015    Fatigue 12/09/2014    Hyperlipidemia 12/09/2014     She  has a past surgical history that includes Tubal ligation; pr colonoscopy flx dx w/collj spec when pfrmd (N/A, 8/23/2018); and [place] tooth extraction  Her family history includes Heart attack (age of onset: 72) in her father; No Known Problems in her brother, daughter, mother, sister, sister, sister, and son  She  reports that she has never smoked  She has never used smokeless tobacco  She reports previous alcohol use  She reports that she does not use drugs    Current Outpatient Medications   Medication Sig Dispense Refill    amoxicillin (AMOXIL) 500 MG tablet       cetirizine (ZyrTEC) 10 mg tablet Take 10 mg by mouth daily      dextromethorphan-guaifenesin (MUCINEX DM)  MG per 12 hr tablet Take 1 tablet by mouth 2 (two) times a day as needed for cough 30 tablet 0    fluticasone (FLONASE) 50 mcg/act nasal spray 2 sprays into each nostril daily 16 g 0    neomycin-polymyxin-dexamethasone (MAXITROL) 0 35%-10,000 units/g-0 1%       valACYclovir (VALTREX) 1,000 mg tablet        No current facility-administered medications for this visit  Current Outpatient Medications on File Prior to Visit   Medication Sig    amoxicillin (AMOXIL) 500 MG tablet     cetirizine (ZyrTEC) 10 mg tablet Take 10 mg by mouth daily    dextromethorphan-guaifenesin (MUCINEX DM)  MG per 12 hr tablet Take 1 tablet by mouth 2 (two) times a day as needed for cough    fluticasone (FLONASE) 50 mcg/act nasal spray 2 sprays into each nostril daily    neomycin-polymyxin-dexamethasone (MAXITROL) 0 35%-10,000 units/g-0 1%     valACYclovir (VALTREX) 1,000 mg tablet      No current facility-administered medications on file prior to visit  She is allergic to codeine       Review of Systems   Constitutional: Negative  HENT: Negative  Eyes: Negative for photophobia, pain, discharge, redness, itching and visual disturbance  Redundant tissue both upper lower eyelids   Respiratory: Negative  Cardiovascular: Negative  Gastrointestinal: Negative  Endocrine: Negative  Genitourinary: Negative  Musculoskeletal: Negative  Skin: Negative  Allergic/Immunologic: Negative  Neurological: Negative  Hematological: Negative  Psychiatric/Behavioral: Negative  Objective:      /84   Pulse 79   Temp 97 6 °F (36 4 °C) (Tympanic)   Ht 5' 4 5" (1 638 m)   Wt 54 4 kg (120 lb)   LMP  (LMP Unknown)   SpO2 99%   BMI 20 28 kg/m²          Physical Exam  Vitals signs and nursing note reviewed  Constitutional:       General: She is not in acute distress  Appearance: Normal appearance  She is normal weight  She is not ill-appearing, toxic-appearing or diaphoretic  Comments: l patient is a healthy appearing 19-year-old female who is awake alert no acute distress and oriented x3   HENT:      Head: Normocephalic and atraumatic  Right Ear: Tympanic membrane, ear canal and external ear normal  There is no impacted cerumen        Left Ear: Tympanic membrane, ear canal and external ear normal  There is no impacted cerumen  Nose: Nose normal  No congestion or rhinorrhea  Mouth/Throat:      Mouth: Mucous membranes are moist       Pharynx: Oropharynx is clear  No oropharyngeal exudate or posterior oropharyngeal erythema  Eyes:      General: No scleral icterus  Right eye: No discharge  Left eye: No discharge  Extraocular Movements: Extraocular movements intact  Conjunctiva/sclera: Conjunctivae normal       Pupils: Pupils are equal, round, and reactive to light  Comments: Redundant tissue to both upper lower eyelids bilaterally, entropion   Neck:      Musculoskeletal: Normal range of motion and neck supple  No neck rigidity or muscular tenderness  Vascular: No carotid bruit  Cardiovascular:      Rate and Rhythm: Normal rate and regular rhythm  Pulses: Normal pulses  Heart sounds: Normal heart sounds  No murmur  No friction rub  No gallop  Pulmonary:      Effort: Pulmonary effort is normal  No respiratory distress  Breath sounds: Normal breath sounds  No stridor  No wheezing, rhonchi or rales  Chest:      Chest wall: No tenderness  Abdominal:      General: Abdomen is flat  Bowel sounds are normal  There is no distension  Palpations: Abdomen is soft  There is no mass  Tenderness: There is no abdominal tenderness  There is no right CVA tenderness, left CVA tenderness, guarding or rebound  Hernia: No hernia is present  Musculoskeletal: Normal range of motion  General: No swelling, tenderness, deformity or signs of injury  Right lower leg: No edema  Left lower leg: No edema  Lymphadenopathy:      Cervical: No cervical adenopathy  Skin:     General: Skin is warm and dry  Coloration: Skin is not jaundiced or pale  Findings: No bruising, erythema, lesion or rash  Neurological:      General: No focal deficit present  Mental Status: She is alert and oriented to person, place, and time  Mental status is at baseline  Cranial Nerves: No cranial nerve deficit  Sensory: No sensory deficit  Motor: No weakness  Coordination: Coordination normal       Gait: Gait normal       Deep Tendon Reflexes: Reflexes normal    Psychiatric:         Mood and Affect: Mood normal          Behavior: Behavior normal          Thought Content:  Thought content normal          Judgment: Judgment normal

## 2021-02-10 NOTE — ASSESSMENT & PLAN NOTE
Patient is here today for preop evaluation, states that she had plastic surgery to the eyelids approximately 8 years ago  She has a redundancy of tissue above her both eyelids and excessive tissue below her eyelids bilaterally  Will be going for surgical procedure which she states will be mostly laser treatment  They are asking for preop evaluation prior to the procedure  Patient did have an EKG performed in the office today which is essentially normal   Underwent physical exam today in the office showing a healthy young adult and there are no contraindications for the surgery as planned  We did review recent lab testing with the patient

## 2021-02-17 RX ORDER — MULTIVIT WITH MINERALS/LUTEIN
1000 TABLET ORAL DAILY
COMMUNITY

## 2021-02-17 RX ORDER — NEOMYCIN/POLYMYXIN B/DEXAMETHA 3.5-10K-.1
OINTMENT (GRAM) OPHTHALMIC (EYE)
COMMUNITY

## 2021-02-17 NOTE — PRE-PROCEDURE INSTRUCTIONS
Pre-Surgery Instructions:   Medication Instructions    Ascorbic Acid (vitamin C) 1000 MG tablet Patient was instructed by Physician and understands   cetirizine (ZyrTEC) 10 mg tablet Instructed to take as needed including DOS with sips water    dextromethorphan-guaifenesin (MUCINEX DM)  MG per 12 hr tablet Instructed to take as needed     fluticasone (FLONASE) 50 mcg/act nasal spray Instructed to take as needed including DOS    Misc Natural Products (Curcumax Pro) TABS Patient was instructed by Physician and understands   Neomycin-Polymyxin-Dexameth (Maxitrol) 0 1 % OINT Patient was instructed by Physician and understands   valACYclovir (VALTREX) 1,000 mg tablet Patient was instructed by Physician and understands  Preop Instructions per My Surgical Experience booklet,medications per anesthesia guidelines and showering instructions per [facility] protocol using their own soap/shampoo reviewed  Pt  Verbalized understanding of current visitor restrictions  Instructed to avoid all ASA/NSAIDs and OTC Vit/Supp from now until after surgery  Tylenol ok prn  Pt  Verbalized an understanding of all instructions reviewed and offers no concerns at this time

## 2021-02-22 ENCOUNTER — ANESTHESIA EVENT (OUTPATIENT)
Dept: PERIOP | Facility: HOSPITAL | Age: 61
End: 2021-02-22
Payer: COMMERCIAL

## 2021-02-22 NOTE — ANESTHESIA PREPROCEDURE EVALUATION
Procedure:  ECTROPION REPAIR LLOU, LATERAL CANTHOPLASTY LLOU, ADJ TISSUE REARRANGEMENT CHEEK OU, ADJ TISSUE REARRANGEMENT LID OU W/LASER (Bilateral Eye)    Relevant Problems   ANESTHESIA  hx of post op somnolence      CARDIO   (+) Hyperlipidemia      ENDO (within normal limits)      /RENAL (within normal limits)      GYN  PM      HEMATOLOGY (within normal limits)      MUSCULOSKELETAL (within normal limits)      NEURO/PSYCH (within normal limits)      PULMONARY   (+) Upper respiratory tract infection   (-) Sleep apnea   (-) Smoking        Physical Exam    Airway    Mallampati score: III  TM Distance: >3 FB  Neck ROM: limited     Dental       Cardiovascular  Cardiovascular exam normal    Pulmonary  Pulmonary exam normal     Other Findings  Fixed upper and lower teeth and in good repair      Anesthesia Plan  ASA Score- 2     Anesthesia Type- general with ASA Monitors  Additional Monitors:   Airway Plan:     Comment: No Versed   Long hx of post op somnolence  Plan Factors-Exercise tolerance (METS): >4 METS  Chart reviewed  EKG reviewed  Existing labs reviewed  Patient summary reviewed  Patient is not a current smoker  Patient not instructed to abstain from smoking on day of procedure  Patient did not smoke on day of surgery  Induction- intravenous  Postoperative Plan- Plan for postoperative opioid use  Informed Consent- Anesthetic plan and risks discussed with patient and spouse  I personally reviewed this patient with the CRNA  Discussed and agreed on the Anesthesia Plan with the CRNA  Shanell Garcia

## 2021-02-23 ENCOUNTER — HOSPITAL ENCOUNTER (OUTPATIENT)
Facility: HOSPITAL | Age: 61
Setting detail: OUTPATIENT SURGERY
Discharge: HOME | End: 2021-02-23
Attending: OPHTHALMOLOGY | Admitting: OPHTHALMOLOGY
Payer: COMMERCIAL

## 2021-02-23 ENCOUNTER — ANESTHESIA (OUTPATIENT)
Dept: PERIOP | Facility: HOSPITAL | Age: 61
End: 2021-02-23
Payer: COMMERCIAL

## 2021-02-23 VITALS
OXYGEN SATURATION: 97 % | HEART RATE: 78 BPM | DIASTOLIC BLOOD PRESSURE: 88 MMHG | SYSTOLIC BLOOD PRESSURE: 157 MMHG | HEIGHT: 65 IN | WEIGHT: 120 LBS | BODY MASS INDEX: 19.99 KG/M2 | TEMPERATURE: 97.6 F | RESPIRATION RATE: 16 BRPM

## 2021-02-23 VITALS — HEART RATE: 83 BPM

## 2021-02-23 PROBLEM — H02.125 MECHANICAL ECTROPION OF LEFT LOWER EYELID: Status: ACTIVE | Noted: 2021-02-23

## 2021-02-23 PROBLEM — H02.89 OTHER SPECIFIED DISORDERS OF EYELID: Status: ACTIVE | Noted: 2021-02-23

## 2021-02-23 PROBLEM — H02.79 OTHER DEGENERATIVE DISORDERS OF EYELID AND PERIOCULAR AREA: Status: ACTIVE | Noted: 2021-02-23

## 2021-02-23 PROBLEM — H16.213 EXPOSURE KERATOCONJUNCTIVITIS, BILATERAL: Status: ACTIVE | Noted: 2021-02-23

## 2021-02-23 PROBLEM — H02.222 MECHANICAL LAGOPHTHALMOS RIGHT LOWER EYELID: Status: ACTIVE | Noted: 2021-02-23

## 2021-02-23 PROBLEM — H02.225 MECHANICAL LAGOPHTHALMOS LEFT LOWER EYELID: Status: ACTIVE | Noted: 2021-02-23

## 2021-02-23 PROBLEM — H02.122 MECHANICAL ECTROPION OF RIGHT LOWER EYELID: Status: ACTIVE | Noted: 2021-02-23

## 2021-02-23 RX ORDER — ACETAMINOPHEN 325 MG/1
650 TABLET ORAL EVERY 4 HOURS PRN
Status: DISCONTINUED | OUTPATIENT
Start: 2021-02-23 | End: 2021-02-23 | Stop reason: HOSPADM

## 2021-02-23 RX ORDER — LIDOCAINE HYDROCHLORIDE AND EPINEPHRINE BITARTRATE 20; .01 MG/ML; MG/ML
INJECTION, SOLUTION SUBCUTANEOUS AS NEEDED
Status: DISCONTINUED | OUTPATIENT
Start: 2021-02-23 | End: 2021-02-23 | Stop reason: HOSPADM

## 2021-02-23 RX ORDER — MEPERIDINE HYDROCHLORIDE 25 MG/ML
12.5 INJECTION INTRAMUSCULAR; INTRAVENOUS; SUBCUTANEOUS
Status: DISCONTINUED | OUTPATIENT
Start: 2021-02-23 | End: 2021-02-23 | Stop reason: HOSPADM

## 2021-02-23 RX ORDER — SODIUM CHLORIDE 9 MG/ML
125 INJECTION, SOLUTION INTRAVENOUS CONTINUOUS
Status: DISCONTINUED | OUTPATIENT
Start: 2021-02-23 | End: 2021-02-23 | Stop reason: HOSPADM

## 2021-02-23 RX ORDER — EPINEPHRINE 1 MG/ML
INJECTION, SOLUTION, CONCENTRATE INTRAVENOUS AS NEEDED
Status: DISCONTINUED | OUTPATIENT
Start: 2021-02-23 | End: 2021-02-23 | Stop reason: HOSPADM

## 2021-02-23 RX ORDER — FENTANYL CITRATE/PF 50 MCG/ML
25 SYRINGE (ML) INJECTION
Status: DISCONTINUED | OUTPATIENT
Start: 2021-02-23 | End: 2021-02-23 | Stop reason: HOSPADM

## 2021-02-23 RX ORDER — DIPHENHYDRAMINE HYDROCHLORIDE 50 MG/ML
12.5 INJECTION INTRAMUSCULAR; INTRAVENOUS ONCE AS NEEDED
Status: DISCONTINUED | OUTPATIENT
Start: 2021-02-23 | End: 2021-02-23 | Stop reason: HOSPADM

## 2021-02-23 RX ORDER — TOBRAMYCIN AND DEXAMETHASONE 3; 1 MG/ML; MG/ML
2 SUSPENSION/ DROPS OPHTHALMIC ONCE
Status: DISCONTINUED | OUTPATIENT
Start: 2021-02-23 | End: 2021-02-23 | Stop reason: HOSPADM

## 2021-02-23 RX ORDER — FENTANYL CITRATE/PF 50 MCG/ML
12.5 SYRINGE (ML) INJECTION
Status: DISCONTINUED | OUTPATIENT
Start: 2021-02-23 | End: 2021-02-23 | Stop reason: HOSPADM

## 2021-02-23 RX ORDER — ONDANSETRON 2 MG/ML
INJECTION INTRAMUSCULAR; INTRAVENOUS AS NEEDED
Status: DISCONTINUED | OUTPATIENT
Start: 2021-02-23 | End: 2021-02-23

## 2021-02-23 RX ORDER — DEXAMETHASONE SODIUM PHOSPHATE 4 MG/ML
4 INJECTION, SOLUTION INTRA-ARTICULAR; INTRALESIONAL; INTRAMUSCULAR; INTRAVENOUS; SOFT TISSUE ONCE AS NEEDED
Status: DISCONTINUED | OUTPATIENT
Start: 2021-02-23 | End: 2021-02-23 | Stop reason: HOSPADM

## 2021-02-23 RX ORDER — PROCHLORPERAZINE MALEATE 5 MG/1
10 TABLET ORAL EVERY 6 HOURS PRN
Status: DISCONTINUED | OUTPATIENT
Start: 2021-02-23 | End: 2021-02-23 | Stop reason: HOSPADM

## 2021-02-23 RX ORDER — TETRACAINE HYDROCHLORIDE 5 MG/ML
SOLUTION OPHTHALMIC AS NEEDED
Status: DISCONTINUED | OUTPATIENT
Start: 2021-02-23 | End: 2021-02-23 | Stop reason: HOSPADM

## 2021-02-23 RX ORDER — MAGNESIUM HYDROXIDE 1200 MG/15ML
LIQUID ORAL AS NEEDED
Status: DISCONTINUED | OUTPATIENT
Start: 2021-02-23 | End: 2021-02-23 | Stop reason: HOSPADM

## 2021-02-23 RX ORDER — PROMETHAZINE HYDROCHLORIDE 25 MG/ML
12.5 INJECTION, SOLUTION INTRAMUSCULAR; INTRAVENOUS ONCE AS NEEDED
Status: DISCONTINUED | OUTPATIENT
Start: 2021-02-23 | End: 2021-02-23 | Stop reason: HOSPADM

## 2021-02-23 RX ORDER — NEOMYCIN SULFATE, POLYMYXIN B SULFATE, AND DEXAMETHASONE 3.5; 10000; 1 MG/G; [USP'U]/G; MG/G
OINTMENT OPHTHALMIC 3 TIMES DAILY
Status: DISCONTINUED | OUTPATIENT
Start: 2021-02-23 | End: 2021-02-23 | Stop reason: HOSPADM

## 2021-02-23 RX ORDER — LIDOCAINE HYDROCHLORIDE 10 MG/ML
INJECTION, SOLUTION EPIDURAL; INFILTRATION; INTRACAUDAL; PERINEURAL AS NEEDED
Status: DISCONTINUED | OUTPATIENT
Start: 2021-02-23 | End: 2021-02-23

## 2021-02-23 RX ORDER — NEOMYCIN SULFATE, POLYMYXIN B SULFATE, AND DEXAMETHASONE 3.5; 10000; 1 MG/G; [USP'U]/G; MG/G
OINTMENT OPHTHALMIC AS NEEDED
Status: DISCONTINUED | OUTPATIENT
Start: 2021-02-23 | End: 2021-02-23 | Stop reason: HOSPADM

## 2021-02-23 RX ORDER — DEXAMETHASONE SODIUM PHOSPHATE 10 MG/ML
INJECTION, SOLUTION INTRAMUSCULAR; INTRAVENOUS AS NEEDED
Status: DISCONTINUED | OUTPATIENT
Start: 2021-02-23 | End: 2021-02-23

## 2021-02-23 RX ORDER — PROPOFOL 10 MG/ML
INJECTION, EMULSION INTRAVENOUS AS NEEDED
Status: DISCONTINUED | OUTPATIENT
Start: 2021-02-23 | End: 2021-02-23

## 2021-02-23 RX ORDER — FENTANYL CITRATE 50 UG/ML
INJECTION, SOLUTION INTRAMUSCULAR; INTRAVENOUS AS NEEDED
Status: DISCONTINUED | OUTPATIENT
Start: 2021-02-23 | End: 2021-02-23

## 2021-02-23 RX ADMIN — PROPOFOL 200 MG: 10 INJECTION, EMULSION INTRAVENOUS at 10:34

## 2021-02-23 RX ADMIN — DEXAMETHASONE SODIUM PHOSPHATE 4 MG: 10 INJECTION, SOLUTION INTRAMUSCULAR; INTRAVENOUS at 10:42

## 2021-02-23 RX ADMIN — LIDOCAINE HYDROCHLORIDE 50 MG: 10 INJECTION, SOLUTION EPIDURAL; INFILTRATION; INTRACAUDAL; PERINEURAL at 10:34

## 2021-02-23 RX ADMIN — ONDANSETRON HYDROCHLORIDE 4 MG: 2 INJECTION, SOLUTION INTRAMUSCULAR; INTRAVENOUS at 12:08

## 2021-02-23 RX ADMIN — FENTANYL CITRATE 50 MCG: 50 INJECTION INTRAMUSCULAR; INTRAVENOUS at 10:55

## 2021-02-23 RX ADMIN — FENTANYL CITRATE 50 MCG: 50 INJECTION INTRAMUSCULAR; INTRAVENOUS at 10:34

## 2021-02-23 RX ADMIN — SODIUM CHLORIDE: 0.9 INJECTION, SOLUTION INTRAVENOUS at 10:17

## 2021-02-23 NOTE — INTERVAL H&P NOTE
H&P reviewed  After examining the patient I find no changes in the patients condition since the H&P had been written      Vitals:    02/23/21 0839   BP: 139/83   Pulse: 82   Resp: 16   Temp: 97 6 °F (36 4 °C)   SpO2: 99%

## 2021-02-23 NOTE — DISCHARGE INSTR - AVS FIRST PAGE
Patient to follow up with Emeli Eduardo MD in office in 1 week for suture removal and follow up  Patient has postop instructions provided from the office  ICE IS YOUR FRIEND!!! YOU CANNOT OVERDO THE ICE! Please apply iced compresses as much as humanly possible for the next 5 days, and then at least 20n minutes per hour while awake until you are seen in the office

## 2021-02-23 NOTE — OP NOTE
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]  Mechanical lagophthalmos left lower eyelid [H02 225]    Post-Op Diagnosis Codes:     * 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]     * Mechanical ectropion of left lower eyelid [H02 125]     * 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]     * Other specified disorders of eyelid [H02 89]     * Exposure keratoconjunctivitis, bilateral [Q29 900]     * 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]     * Mechanical lagophthalmos left lower eyelid [H02 225]    Procedure(s) (LRB):  ECTROPION REPAIR LLOU, LATERAL CANTHOPLASTY LLOU, ADJ TISSUE REARRANGEMENT CHEEK OU, ADJ TISSUE REARRANGEMENT LID OU W/LASER (Bilateral)    Specimen(s):  * No specimens in log *    Estimated Blood Loss:   Minimal    Drains:  * No LDAs found *    Anesthesia Type:   IV Sedation with Anesthesia    Operative Indications:  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]  Mechanical ectropion of left lower eyelid [H02 125]  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]  Other specified disorders of eyelid [H02 89]  Exposure keratoconjunctivitis, bilateral [L41 967]  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]  Mechanical lagophthalmos left lower eyelid [H02 225]    The patient had had lower eyelid surgery four times on the right lower eyelid and three times on the left lower eyelid elsewhere with scarring and extensive lower eyelid and cheek deformities and requested correction  Operative Findings:  Extensive scarring and fat loculations    Complications:   None    Procedure and Technique:    The patient was brought to the operating room and placed supine on the operating room table after being identified by the surgeon  Corneoscleral shields were placed in each eye sterilely with a small amount of Maxitrol ointment and a transconjunctival injection was given into the lower lid and then into the lateral canthal area and over the cheek area using 2% Xylocaine with Epinephrine mixed with Wydase  Gentle massage was applied and the face was prepped and draped in the usual sterile fashion  Attention was turned to the lower lids where the mechanical portion of the ectropions were addressed by removing the prolapsed fat pads through a transconjunctival incision medially and laterally  All of the fat pockets were removed carefully through this method with meticulous hemostasis maintained with cautery  There was extensive scarring and loculation of the fat pockets with displacement  Care was taken to avoid the inferior oblique muscle and good release of downward pull and good symmetry were obtained  Next, a lateral canthotomy and inferocantholysis was performed with lysis of scar tissue and dissection over the malar eminence, until the lid and cheek area could be elevated to allow proper realignment and closure of the lids  Extensive dissection was carried out to achieve this  The lid was pulled laterally and the horizontal excess determined and a full thickness wedge of lid was excised  Next, the lateral portion of tarsus was resuspended inside the lateral orbital rim using a 4-0 Prolene suture with a PS5 needle in a vertical mattress fashion  This gave a nice lift and almond shaped lateral canthal angle  The tendon was then reanastomosed to the superior limb of the tendon   Next, the pre-septal tissue was resuspended above the midpupillary axis using a 4-0 Prolene suture, tacking down to periosteum for firm elevation and then the cheek tissue was elevated and swung into place using suspensory Prolene sutures in a similar fashion  Additional tissue and muscle were excised as needed to give good closure and good symmetry and the layered closure was carried out using 6-0 vicryl and ending with 6-0 mild chromic suture for the skin  Next, the CO2 laser was set up and resurfacing was done over the lower lids and  face to remove the multiple facial lesions and actinic damage  Multiple passes were taken as necessary to achieve the desired effect, but care was taken not to go beyond the upper to mid reticular dermis  Several lid lesions were ablated with the laser  The patient tolerated the procedure very well  The corneoscleral shields were  removed and the lasered areas were dressed with a foam dressing  The eyes were dressed with Maxitrol ointment in the eyes and on the suture site  The patient was awoken and iced compresses were placed on the eyes and the patient was taken to the recovery room in excellent condition     I was present for the entire procedure    Patient Disposition:  PACU     SIGNATURE: Lesly Fuchs MD  DATE: February 23, 2021  TIME: 12:27 PM

## 2021-02-23 NOTE — ANESTHESIA POSTPROCEDURE EVALUATION
Post-Op Assessment Note    CV Status:  Stable  Pain Score: 2    Pain management: adequate     Mental Status:  Alert and awake   Hydration Status:  Euvolemic   PONV Controlled:  Controlled   Airway Patency:  Patent      Post Op Vitals Reviewed: Yes      Staff: Anesthesiologist, CRNA   Comments: LMA wlithut any known complications        No complications documented      /83 (02/23/21 1245)    Temp     Pulse 79 (02/23/21 1245)   Resp 12 (02/23/21 1245)    SpO2 99 % (02/23/21 1245)

## 2021-02-23 NOTE — ANESTHESIA POSTPROCEDURE EVALUATION
Post-Op Assessment Note    CV Status:  Stable  Pain Score: 0    Pain management: adequate     Mental Status:  Alert and awake   Hydration Status:  Euvolemic   PONV Controlled:  Controlled   Airway Patency:  Patent      Post Op Vitals Reviewed: Yes      Staff: CRNA, Anesthesiologist         No complications documented      /82 (02/23/21 1220)    Temp (!) 97 °F (36 1 °C) (02/23/21 1220)    Pulse 89 (02/23/21 1220)   Resp 16 (02/23/21 1220)    SpO2 98 % (02/23/21 1220)

## 2021-03-03 ENCOUNTER — OFFICE VISIT (OUTPATIENT)
Dept: OBGYN CLINIC | Facility: CLINIC | Age: 61
End: 2021-03-03
Payer: COMMERCIAL

## 2021-03-03 ENCOUNTER — APPOINTMENT (OUTPATIENT)
Dept: RADIOLOGY | Facility: AMBULARY SURGERY CENTER | Age: 61
End: 2021-03-03
Attending: SURGERY
Payer: COMMERCIAL

## 2021-03-03 VITALS
WEIGHT: 120 LBS | HEART RATE: 71 BPM | BODY MASS INDEX: 20.49 KG/M2 | SYSTOLIC BLOOD PRESSURE: 120 MMHG | DIASTOLIC BLOOD PRESSURE: 74 MMHG | HEIGHT: 64 IN

## 2021-03-03 DIAGNOSIS — M79.641 PAIN IN BOTH HANDS: ICD-10-CM

## 2021-03-03 DIAGNOSIS — M67.441 GANGLION CYST OF FINGER OF RIGHT HAND: ICD-10-CM

## 2021-03-03 DIAGNOSIS — M79.642 PAIN IN BOTH HANDS: Primary | ICD-10-CM

## 2021-03-03 DIAGNOSIS — Z01.812 PRE-PROCEDURE LAB EXAM: ICD-10-CM

## 2021-03-03 DIAGNOSIS — M79.642 PAIN IN BOTH HANDS: ICD-10-CM

## 2021-03-03 DIAGNOSIS — M79.641 PAIN IN BOTH HANDS: Primary | ICD-10-CM

## 2021-03-03 PROCEDURE — 73130 X-RAY EXAM OF HAND: CPT

## 2021-03-03 PROCEDURE — 99204 OFFICE O/P NEW MOD 45 MIN: CPT | Performed by: SURGERY

## 2021-03-03 PROCEDURE — 3008F BODY MASS INDEX DOCD: CPT | Performed by: SURGERY

## 2021-03-03 PROCEDURE — 1036F TOBACCO NON-USER: CPT | Performed by: SURGERY

## 2021-03-03 RX ORDER — CHLORHEXIDINE GLUCONATE 0.12 MG/ML
15 RINSE ORAL ONCE
Status: CANCELLED | OUTPATIENT
Start: 2021-03-03 | End: 2021-03-03

## 2021-03-03 NOTE — H&P
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    I personally obtained a written consent after risks benefits alternatives were discussed in detail with the patient  The patient verbalized understanding of exam findings and treatment plan  We engaged in the shared decision-making process and treatment options were discussed at length with the patient  Surgical and conservative management discussed today along with risks and benefits  Patient would like to proceed with right long and ring finger PIP mass excision  Patient would like to schedule surgery for May [id number]    Diagnoses and all orders for this visit:    Pain in both hands  -     XR hand 3+ vw right; Future  -     XR hand 3+ vw left; Future    Ganglion cyst of finger of left hand    Ganglion cyst of finger of right hand        Follow Up:  Return for Post op  To Do Next Visit:  Sutures out    Operative Discussions:  Ganglion Cyst Excision: The anatomy and physiology of the ganglion was discussed with the patient today in the office  Fluid leaking out of the joint surface typically creates a small sac, which can enlarge and cause pain or limitation of motion  Treatment options include observation, aspiration, or surgical incision were discussed with the patient today  Observation typically lead to resolution and approximately 10% of patients, aspiration least resolution approximately 50% of patients, and surgical excision lead to resolution in approximately 97% of patients  After discussion with the patient today, the patient voiced understanding of all treatment options  The patient has elected excision of the ganglion  The risks and benefits of the procedure were explained to the patient, which include, but are not limited to: Bleeding, infection, recurrence, pain, scar, damage to tendons, damage to nerves, and damage to blood vessels, failure to give desired results and complications related to anesthesia  These risks, along with alternative conservative treatment options, and postoperative protocols were voiced back and understood by the patient  All questions were answered to the patient's satisfaction  The patient agrees to comply with a standard postoperative protocol, and is willing to proceed  Education was provided via written and auditory forms  There were no barriers to learning  Written handouts regarding wound care, incision and scar care, and general preoperative information was provided to the patient  Prior to surgery, the patient may be requested to stop all anti-inflammatory medications  Prophylactic aspirin, Plavix, and Coumadin may be allowed to be continued  Medications including vitamin E , ginkgo, and fish oil are requested to be stopped approximately one week prior to surgery  Hypertensive medications and beta blockers, if taken, should be continued  ____________________________________________________________________________________________________________________________________________      CHIEF COMPLAINT:  Chief Complaint   Patient presents with    Right Hand - Pain    Left Hand - Pain       SUBJECTIVE:  Luis Soria is a 63y o  year old RHD female who presents today reporting "lumps" to PIP joints on right ring and long fingers and left long finger  Patient denies any injury  She states she has had them for 10+ years  She reports increased pain with gripping activities  She denies any numbness and tingling         Pain/symptom timing:  Worse during the day when active  Pain/symptom context:  Worse with activites and work  Pain/symptom modifying factors:  Rest makes better, activities make worse  Pain/symptom associated signs/symptoms: none    Prior treatment   · NSAIDsNo · Injections No   · Bracing/Orthotics No    Physical Therapy No     I have personally reviewed all the relevant PMH, PSH, SH, FH, Medications and allergies      PAST MEDICAL HISTORY:  Past Medical History:   Diagnosis Date    Anesthesia complication     "Slow to Wake up" post op    Nonadherence to medical treatment     No show for 2 pelvic floor PT appt       PAST SURGICAL HISTORY:  Past Surgical History:   Procedure Laterality Date    ECTROPIAN REPAIR Bilateral 2/23/2021    Procedure: ECTROPION REPAIR LLOU, LATERAL CANTHOPLASTY LLOU, ADJ TISSUE REARRANGEMENT CHEEK OU, ADJ TISSUE REARRANGEMENT LID OU W/LASER;  Surgeon: Ivone Obrien MD;  Location: [address] [place] [address];  Service: Ophthalmology    FACELIFT      Mini-Done under local in [address] [place] [address] DX W/RACHEL Ramirez 1979 PFRMD N/A 8/23/2018    Procedure: COLONOSCOPY;  Surgeon: Clarissa Hampton MD;  [facility];   Service: Gastroenterology    TUBAL LIGATION      WISDOM TOOTH EXTRACTION         FAMILY HISTORY:  Family History   Problem Relation Age of Onset    Heart attack Father 72    No Known Problems Mother     No Known Problems Sister     No Known Problems Brother     No Known Problems Daughter     No Known Problems Son     No Known Problems Sister     No Known Problems Sister        SOCIAL HISTORY:  Social History     Tobacco Use    Smoking status: Never Smoker    Smokeless tobacco: Never Used   Substance Use Topics    Alcohol use: Not Currently    Drug use: No       MEDICATIONS:    Current Outpatient Medications:     Ascorbic Acid (vitamin C) 1000 MG tablet, Take 1,000 mg by mouth daily Will increase dose to 2000 mg day before surgery, Disp: , Rfl:     cetirizine (ZyrTEC) 10 mg tablet, Take 5 mg by mouth as needed Takes 1/2 tablet = 5mg, Disp: , Rfl:     dextromethorphan-guaifenesin (MUCINEX DM)  MG per 12 hr tablet, Take 1 tablet by mouth 2 (two) times a day as needed for cough, Disp: 30 tablet, Rfl: 0    fluticasone (FLONASE) 50 mcg/act nasal spray, 2 sprays into each nostril daily (Patient taking differently: 2 sprays into each nostril as needed ), Disp: 16 g, Rfl: 0    Misc Natural Products (Curcumax Pro) TABS, Take 2 tablets by mouth 3 (three) times a day Start day before surgery, Disp: , Rfl:     Neomycin-Polymyxin-Dexameth (Maxitrol) 0 1 % OINT, Apply to eye Post op, Disp: , Rfl:     valACYclovir (VALTREX) 1,000 mg tablet, 1,000 mg Start day before surgery and continue till completed, Disp: , Rfl:     ALLERGIES:  Allergies   Allergen Reactions    Codeine GI Intolerance and Abdominal Pain           REVIEW OF SYSTEMS:  Review of Systems   Constitutional: Negative for chills and fever  HENT: Negative for drooling and sneezing  Eyes: Negative for redness  Respiratory: Negative for cough and wheezing  Gastrointestinal: Negative for nausea and vomiting  Musculoskeletal:        Please see ortho exam   Psychiatric/Behavioral: Negative for behavioral problems  The patient is not nervous/anxious          VITALS:  Vitals:    03/03/21 1405   BP: 120/74   Pulse: 71       LABS:  HgA1c: No results found for: HGBA1C  BMP:   Lab Results   Component Value Date    GLUCOSE 108 10/01/2015    CALCIUM 9 8 02/04/2021     10/01/2015    K 4 1 02/04/2021    CO2 31 02/04/2021     02/04/2021    BUN 24 02/04/2021    CREATININE 0 82 02/04/2021       _____________________________________________________  PHYSICAL EXAMINATION:  General: well developed and well nourished, alert, oriented times 3 and appears comfortable  Psychiatric: Normal  HEENT: Normocephalic, Atraumatic Trachea Midline, No torticollis  Pulmonary: No audible wheezing or respiratory distress   Cardiovascular: No pitting edema, 2+ radial pulse   Skin:  No Erythema, No Lacerations, No Fluctuation  Neurovascular: Sensation Intact to the Median, Ulnar, Radial Nerve, Motor Intact to the Median, Ulnar, Radial Nerve and Pulses Intact  Musculoskeletal: Normal, except as noted in detailed exam and in HPI  MUSCULOSKELETAL EXAMINATION:  Right long and ring fingers PIP joint  Positive cyst like structure on the PIP joints measuring 5x2mm  Skin is not translucent  There is not skin thinning  Skin is intact  No signs of infection  Left long PIP joint  Very small Positive cyst like structure  Skin is not translucent  There is not skin thinning  Skin is intact  No signs of infection        ___________________________________________________  STUDIES REVIEWED:  I have personally reviewed AP lateral and oblique radiographs of bilateral hands which demonstrate no fracture or dislocation, cyst present distal middle phalanx of of the right long finger          PROCEDURES PERFORMED:  Procedures  No Procedures performed today    _____________________________________________________      Eugena Living    I,:  [facility] am acting as a scribe while in the presence of the attending physician :       I,:  Karen Carlton MD personally performed the services described in this documentation    as scribed in my presence :

## 2021-03-03 NOTE — PROGRESS NOTES
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    I personally obtained a written consent after risks benefits alternatives were discussed in detail with the patient  The patient verbalized understanding of exam findings and treatment plan  We engaged in the shared decision-making process and treatment options were discussed at length with the patient  Surgical and conservative management discussed today along with risks and benefits  Patient would like to proceed with right long and ring finger PIP mass excision  Patient would like to schedule surgery for May [address]    Diagnoses and all orders for this visit:    Pain in both hands  -     XR hand 3+ vw right; Future  -     XR hand 3+ vw left; Future    Ganglion cyst of finger of left hand    Ganglion cyst of finger of right hand        Follow Up:  Return for Post op  To Do Next Visit:  Sutures out    Operative Discussions:  Ganglion Cyst Excision: The anatomy and physiology of the ganglion was discussed with the patient today in the office  Fluid leaking out of the joint surface typically creates a small sac, which can enlarge and cause pain or limitation of motion  Treatment options include observation, aspiration, or surgical incision were discussed with the patient today  Observation typically lead to resolution and approximately 10% of patients, aspiration least resolution approximately 50% of patients, and surgical excision lead to resolution in approximately 97% of patients  After discussion with the patient today, the patient voiced understanding of all treatment options  The patient has elected excision of the ganglion  The risks and benefits of the procedure were explained to the patient, which include, but are not limited to: Bleeding, infection, recurrence, pain, scar, damage to tendons, damage to nerves, and damage to blood vessels, failure to give desired results and complications related to anesthesia  These risks, along with alternative conservative treatment options, and postoperative protocols were voiced back and understood by the patient  All questions were answered to the patient's satisfaction  The patient agrees to comply with a standard postoperative protocol, and is willing to proceed  Education was provided via written and auditory forms  There were no barriers to learning  Written handouts regarding wound care, incision and scar care, and general preoperative information was provided to the patient  Prior to surgery, the patient may be requested to stop all anti-inflammatory medications  Prophylactic aspirin, Plavix, and Coumadin may be allowed to be continued  Medications including vitamin E , ginkgo, and fish oil are requested to be stopped approximately one week prior to surgery  Hypertensive medications and beta blockers, if taken, should be continued  ____________________________________________________________________________________________________________________________________________      CHIEF COMPLAINT:  Chief Complaint   Patient presents with    Right Hand - Pain    Left Hand - Pain       SUBJECTIVE:  Yifan Hargrove is a 63y o  year old RHD female who presents today reporting "lumps" to PIP joints on right ring and long fingers and left long finger  Patient denies any injury  She states she has had them for 10+ years  She reports increased pain with gripping activities  She denies any numbness and tingling         Pain/symptom timing:  Worse during the day when active  Pain/symptom context:  Worse with activites and work  Pain/symptom modifying factors:  Rest makes better, activities make worse  Pain/symptom associated signs/symptoms: none    Prior treatment   · NSAIDsNo · Injections No   · Bracing/Orthotics No    Physical Therapy No     I have personally reviewed all the relevant PMH, PSH, SH, FH, Medications and allergies      PAST MEDICAL HISTORY:  Past Medical History:   Diagnosis Date    Anesthesia complication     "Slow to Wake up" post op    Nonadherence to medical treatment     No show for 2 pelvic floor PT appt       PAST SURGICAL HISTORY:  Past Surgical History:   Procedure Laterality Date    ECTROPIAN REPAIR Bilateral 2/23/2021    Procedure: ECTROPION REPAIR LLOU, LATERAL CANTHOPLASTY LLOU, ADJ TISSUE REARRANGEMENT CHEEK OU, ADJ TISSUE REARRANGEMENT LID OU W/LASER;  Surgeon: Sabino Richter MD;  Location: [address] [place] [address];  Service: Ophthalmology    FACELIFT      Mini-Done under local in [address] [place] [address] DX W/RACHEL Ramirez 1979 PFRMD N/A 8/23/2018    Procedure: COLONOSCOPY;  Surgeon: Josefa Eaton MD;  [facility];   Service: Gastroenterology    TUBAL LIGATION      WISDOM TOOTH EXTRACTION         FAMILY HISTORY:  Family History   Problem Relation Age of Onset    Heart attack Father 72    No Known Problems Mother     No Known Problems Sister     No Known Problems Brother     No Known Problems Daughter     No Known Problems Son     No Known Problems Sister     No Known Problems Sister        SOCIAL HISTORY:  Social History     Tobacco Use    Smoking status: Never Smoker    Smokeless tobacco: Never Used   Substance Use Topics    Alcohol use: Not Currently    Drug use: No       MEDICATIONS:    Current Outpatient Medications:     Ascorbic Acid (vitamin C) 1000 MG tablet, Take 1,000 mg by mouth daily Will increase dose to 2000 mg day before surgery, Disp: , Rfl:     cetirizine (ZyrTEC) 10 mg tablet, Take 5 mg by mouth as needed Takes 1/2 tablet = 5mg, Disp: , Rfl:     dextromethorphan-guaifenesin (MUCINEX DM)  MG per 12 hr tablet, Take 1 tablet by mouth 2 (two) times a day as needed for cough, Disp: 30 tablet, Rfl: 0    fluticasone (FLONASE) 50 mcg/act nasal spray, 2 sprays into each nostril daily (Patient taking differently: 2 sprays into each nostril as needed ), Disp: 16 g, Rfl: 0    Misc Natural Products (Curcumax Pro) TABS, Take 2 tablets by mouth 3 (three) times a day Start day before surgery, Disp: , Rfl:     Neomycin-Polymyxin-Dexameth (Maxitrol) 0 1 % OINT, Apply to eye Post op, Disp: , Rfl:     valACYclovir (VALTREX) 1,000 mg tablet, 1,000 mg Start day before surgery and continue till completed, Disp: , Rfl:     ALLERGIES:  Allergies   Allergen Reactions    Codeine GI Intolerance and Abdominal Pain           REVIEW OF SYSTEMS:  Review of Systems   Constitutional: Negative for chills and fever  HENT: Negative for drooling and sneezing  Eyes: Negative for redness  Respiratory: Negative for cough and wheezing  Gastrointestinal: Negative for nausea and vomiting  Musculoskeletal:        Please see ortho exam   Psychiatric/Behavioral: Negative for behavioral problems  The patient is not nervous/anxious          VITALS:  Vitals:    03/03/21 1405   BP: 120/74   Pulse: 71       LABS:  HgA1c: No results found for: HGBA1C  BMP:   Lab Results   Component Value Date    GLUCOSE 108 10/01/2015    CALCIUM 9 8 02/04/2021     10/01/2015    K 4 1 02/04/2021    CO2 31 02/04/2021     02/04/2021    BUN 24 02/04/2021    CREATININE 0 82 02/04/2021       _____________________________________________________  PHYSICAL EXAMINATION:  General: well developed and well nourished, alert, oriented times 3 and appears comfortable  Psychiatric: Normal  HEENT: Normocephalic, Atraumatic Trachea Midline, No torticollis  Pulmonary: No audible wheezing or respiratory distress   Cardiovascular: No pitting edema, 2+ radial pulse   Skin:  No Erythema, No Lacerations, No Fluctuation  Neurovascular: Sensation Intact to the Median, Ulnar, Radial Nerve, Motor Intact to the Median, Ulnar, Radial Nerve and Pulses Intact  Musculoskeletal: Normal, except as noted in detailed exam and in HPI  MUSCULOSKELETAL EXAMINATION:  Right long and ring fingers PIP joint  Positive cyst like structure on the PIP joints measuring 5x2mm  Skin is not translucent  There is not skin thinning  Skin is intact  No signs of infection  Left long PIP joint  Very small Positive cyst like structure  Skin is not translucent  There is not skin thinning  Skin is intact  No signs of infection        ___________________________________________________  STUDIES REVIEWED:  I have personally reviewed AP lateral and oblique radiographs of bilateral hands which demonstrate no fracture or dislocation, cyst present distal middle phalanx of of the right long finger          PROCEDURES PERFORMED:  Procedures  No Procedures performed today    _____________________________________________________      Salma Everett    I,:  Leeanna Cruz am acting as a scribe while in the presence of the attending physician :       I,:  Karin Loaiza MD personally performed the services described in this documentation    as scribed in my presence :

## 2021-04-22 ENCOUNTER — TELEPHONE (OUTPATIENT)
Dept: OBGYN CLINIC | Facility: MEDICAL CENTER | Age: 61
End: 2021-04-22

## 2021-04-22 NOTE — TELEPHONE ENCOUNTER
Patient sees Dr Logan Ceron  Patient is calling in with questions about her surgery on 5/25/2021  She also has questions on her cost share with her insurance plan      Please contact # [phone]

## 2021-05-04 ENCOUNTER — TELEPHONE (OUTPATIENT)
Dept: OBGYN CLINIC | Facility: CLINIC | Age: 61
End: 2021-05-04

## 2021-05-04 ENCOUNTER — TELEPHONE (OUTPATIENT)
Dept: OBGYN CLINIC | Facility: HOSPITAL | Age: 61
End: 2021-05-04

## 2021-05-04 NOTE — TELEPHONE ENCOUNTER
DR Vanda Sanchez  RE: [facility] surgery    Patient would like to speak to Dr Judson Quan coordinator   Patient is scheduled for Sx 05 25, but would like to rsch to October    I cancelled patients PO appt on 06 07 per request

## 2021-06-28 ENCOUNTER — TELEPHONE (OUTPATIENT)
Dept: INTERNAL MEDICINE CLINIC | Facility: CLINIC | Age: 61
End: 2021-06-28

## 2021-06-28 NOTE — TELEPHONE ENCOUNTER
Patient is having upcoming surgery and is asking for blood work at [facility]  She can be reached at [phone]

## 2021-07-12 ENCOUNTER — ANESTHESIA EVENT (OUTPATIENT)
Dept: PERIOP | Facility: AMBULARY SURGERY CENTER | Age: 61
End: 2021-07-12
Payer: COMMERCIAL

## 2021-07-14 ENCOUNTER — APPOINTMENT (OUTPATIENT)
Dept: LAB | Facility: CLINIC | Age: 61
End: 2021-07-14
Payer: COMMERCIAL

## 2021-07-14 DIAGNOSIS — M67.441 GANGLION CYST OF FINGER OF RIGHT HAND: ICD-10-CM

## 2021-07-14 DIAGNOSIS — Z01.812 PRE-PROCEDURE LAB EXAM: ICD-10-CM

## 2021-07-14 DIAGNOSIS — M79.642 PAIN IN BOTH HANDS: ICD-10-CM

## 2021-07-14 DIAGNOSIS — M79.641 PAIN IN BOTH HANDS: ICD-10-CM

## 2021-07-14 LAB
ANION GAP SERPL CALCULATED.3IONS-SCNC: 3 MMOL/L (ref 4–13)
BUN SERPL-MCNC: 14 MG/DL (ref 5–25)
CALCIUM SERPL-MCNC: 9.4 MG/DL (ref 8.3–10.1)
CHLORIDE SERPL-SCNC: 105 MMOL/L (ref 100–108)
CO2 SERPL-SCNC: 31 MMOL/L (ref 21–32)
CREAT SERPL-MCNC: 0.69 MG/DL (ref 0.6–1.3)
GFR SERPL CREATININE-BSD FRML MDRD: 94 ML/MIN/1.73SQ M
GLUCOSE SERPL-MCNC: 93 MG/DL (ref 65–140)
POTASSIUM SERPL-SCNC: 4.4 MMOL/L (ref 3.5–5.3)
SODIUM SERPL-SCNC: 139 MMOL/L (ref 136–145)

## 2021-07-14 PROCEDURE — 80048 BASIC METABOLIC PNL TOTAL CA: CPT

## 2021-07-14 PROCEDURE — 36415 COLL VENOUS BLD VENIPUNCTURE: CPT

## 2021-07-23 NOTE — PRE-PROCEDURE INSTRUCTIONS
No outpatient medications have been marked as taking for the 7/27/21 encounter [facility] Encounter)  Pt states she is not taking any meds at this time & understands to avoid OTC Vitamins/ Suppl/ Herbals, as well as NSAIDs & ASA  Pt understands NPO after MN     Pt has CHG & verbalizes understanding of bathing instructions

## 2021-07-27 ENCOUNTER — HOSPITAL ENCOUNTER (OUTPATIENT)
Facility: AMBULARY SURGERY CENTER | Age: 61
Setting detail: OUTPATIENT SURGERY
Discharge: HOME | End: 2021-07-27
Attending: SURGERY | Admitting: SURGERY
Payer: COMMERCIAL

## 2021-07-27 ENCOUNTER — ANESTHESIA (OUTPATIENT)
Dept: PERIOP | Facility: AMBULARY SURGERY CENTER | Age: 61
End: 2021-07-27
Payer: COMMERCIAL

## 2021-07-27 VITALS
OXYGEN SATURATION: 99 % | DIASTOLIC BLOOD PRESSURE: 75 MMHG | HEIGHT: 64 IN | RESPIRATION RATE: 16 BRPM | BODY MASS INDEX: 19.97 KG/M2 | HEART RATE: 64 BPM | TEMPERATURE: 97 F | WEIGHT: 117 LBS | SYSTOLIC BLOOD PRESSURE: 123 MMHG

## 2021-07-27 DIAGNOSIS — M67.441 GANGLION OF FLEXOR TENDON SHEATH OF RIGHT MIDDLE FINGER: Primary | ICD-10-CM

## 2021-07-27 DIAGNOSIS — M67.441 GANGLION OF FLEXOR TENDON SHEATH OF RIGHT RING FINGER: ICD-10-CM

## 2021-07-27 DIAGNOSIS — Z47.89 AFTERCARE FOLLOWING SURGERY OF THE MUSCULOSKELETAL SYSTEM: ICD-10-CM

## 2021-07-27 PROCEDURE — 88304 TISSUE EXAM BY PATHOLOGIST: CPT | Performed by: PATHOLOGY

## 2021-07-27 PROCEDURE — 26160 REMOVE TENDON SHEATH LESION: CPT | Performed by: SURGERY

## 2021-07-27 PROCEDURE — 26160 REMOVE TENDON SHEATH LESION: CPT | Performed by: PHYSICIAN ASSISTANT

## 2021-07-27 PROCEDURE — NC001 PR NO CHARGE: Performed by: SURGERY

## 2021-07-27 RX ORDER — HYDROCODONE BITARTRATE AND ACETAMINOPHEN 5; 325 MG/1; MG/1
1 TABLET ORAL EVERY 6 HOURS PRN
Status: DISCONTINUED | OUTPATIENT
Start: 2021-07-27 | End: 2021-07-27 | Stop reason: HOSPADM

## 2021-07-27 RX ORDER — PROPOFOL 10 MG/ML
INJECTION, EMULSION INTRAVENOUS CONTINUOUS PRN
Status: DISCONTINUED | OUTPATIENT
Start: 2021-07-27 | End: 2021-07-27

## 2021-07-27 RX ORDER — PROPOFOL 10 MG/ML
INJECTION, EMULSION INTRAVENOUS AS NEEDED
Status: DISCONTINUED | OUTPATIENT
Start: 2021-07-27 | End: 2021-07-27

## 2021-07-27 RX ORDER — CEFAZOLIN SODIUM 2 G/50ML
2000 SOLUTION INTRAVENOUS ONCE
Status: DISCONTINUED | OUTPATIENT
Start: 2021-07-27 | End: 2021-07-27 | Stop reason: DRUGHIGH

## 2021-07-27 RX ORDER — ONDANSETRON 2 MG/ML
4 INJECTION INTRAMUSCULAR; INTRAVENOUS ONCE AS NEEDED
Status: DISCONTINUED | OUTPATIENT
Start: 2021-07-27 | End: 2021-07-27 | Stop reason: HOSPADM

## 2021-07-27 RX ORDER — MIDAZOLAM HYDROCHLORIDE 2 MG/2ML
INJECTION, SOLUTION INTRAMUSCULAR; INTRAVENOUS AS NEEDED
Status: DISCONTINUED | OUTPATIENT
Start: 2021-07-27 | End: 2021-07-27

## 2021-07-27 RX ORDER — FENTANYL CITRATE/PF 50 MCG/ML
25 SYRINGE (ML) INJECTION
Status: DISCONTINUED | OUTPATIENT
Start: 2021-07-27 | End: 2021-07-27 | Stop reason: HOSPADM

## 2021-07-27 RX ORDER — LIDOCAINE HYDROCHLORIDE 10 MG/ML
INJECTION, SOLUTION EPIDURAL; INFILTRATION; INTRACAUDAL; PERINEURAL AS NEEDED
Status: DISCONTINUED | OUTPATIENT
Start: 2021-07-27 | End: 2021-07-27

## 2021-07-27 RX ORDER — SODIUM CHLORIDE, SODIUM LACTATE, POTASSIUM CHLORIDE, CALCIUM CHLORIDE 600; 310; 30; 20 MG/100ML; MG/100ML; MG/100ML; MG/100ML
50 INJECTION, SOLUTION INTRAVENOUS CONTINUOUS
Status: DISCONTINUED | OUTPATIENT
Start: 2021-07-27 | End: 2021-07-27 | Stop reason: HOSPADM

## 2021-07-27 RX ORDER — ONDANSETRON 2 MG/ML
INJECTION INTRAMUSCULAR; INTRAVENOUS AS NEEDED
Status: DISCONTINUED | OUTPATIENT
Start: 2021-07-27 | End: 2021-07-27

## 2021-07-27 RX ORDER — FENTANYL CITRATE 50 UG/ML
INJECTION, SOLUTION INTRAMUSCULAR; INTRAVENOUS AS NEEDED
Status: DISCONTINUED | OUTPATIENT
Start: 2021-07-27 | End: 2021-07-27

## 2021-07-27 RX ORDER — CEFAZOLIN SODIUM 1 G/50ML
1000 SOLUTION INTRAVENOUS ONCE
Status: COMPLETED | OUTPATIENT
Start: 2021-07-27 | End: 2021-07-27

## 2021-07-27 RX ORDER — HYDROCODONE BITARTRATE AND ACETAMINOPHEN 5; 325 MG/1; MG/1
1 TABLET ORAL EVERY 6 HOURS PRN
Qty: 12 TABLET | Refills: 0 | Status: SHIPPED | OUTPATIENT
Start: 2021-07-27 | End: 2021-08-06

## 2021-07-27 RX ORDER — SODIUM CHLORIDE, SODIUM LACTATE, POTASSIUM CHLORIDE, CALCIUM CHLORIDE 600; 310; 30; 20 MG/100ML; MG/100ML; MG/100ML; MG/100ML
INJECTION, SOLUTION INTRAVENOUS CONTINUOUS PRN
Status: DISCONTINUED | OUTPATIENT
Start: 2021-07-27 | End: 2021-07-27

## 2021-07-27 RX ORDER — ONDANSETRON 4 MG/1
4 TABLET, FILM COATED ORAL EVERY 8 HOURS PRN
Qty: 20 TABLET | Refills: 0 | Status: SHIPPED | OUTPATIENT
Start: 2021-07-27

## 2021-07-27 RX ADMIN — PROPOFOL 50 MG: 10 INJECTION, EMULSION INTRAVENOUS at 10:30

## 2021-07-27 RX ADMIN — PROPOFOL 50 MG: 10 INJECTION, EMULSION INTRAVENOUS at 09:57

## 2021-07-27 RX ADMIN — PROPOFOL 50 MG: 10 INJECTION, EMULSION INTRAVENOUS at 10:24

## 2021-07-27 RX ADMIN — FENTANYL CITRATE 25 MCG: 50 INJECTION, SOLUTION INTRAMUSCULAR; INTRAVENOUS at 10:24

## 2021-07-27 RX ADMIN — SODIUM CHLORIDE, SODIUM LACTATE, POTASSIUM CHLORIDE, AND CALCIUM CHLORIDE: .6; .31; .03; .02 INJECTION, SOLUTION INTRAVENOUS at 09:47

## 2021-07-27 RX ADMIN — LIDOCAINE HYDROCHLORIDE 30 MG: 10 INJECTION, SOLUTION EPIDURAL; INFILTRATION; INTRACAUDAL at 09:57

## 2021-07-27 RX ADMIN — PROPOFOL 50 MCG/KG/MIN: 10 INJECTION, EMULSION INTRAVENOUS at 09:57

## 2021-07-27 RX ADMIN — CEFAZOLIN SODIUM 1000 MG: 1 SOLUTION INTRAVENOUS at 09:50

## 2021-07-27 RX ADMIN — MIDAZOLAM HYDROCHLORIDE 2 MG: 1 INJECTION, SOLUTION INTRAMUSCULAR; INTRAVENOUS at 09:52

## 2021-07-27 RX ADMIN — PROPOFOL 30 MG: 10 INJECTION, EMULSION INTRAVENOUS at 09:59

## 2021-07-27 RX ADMIN — ONDANSETRON 4 MG: 2 INJECTION INTRAMUSCULAR; INTRAVENOUS at 10:04

## 2021-07-27 RX ADMIN — SODIUM CHLORIDE, SODIUM LACTATE, POTASSIUM CHLORIDE, AND CALCIUM CHLORIDE: .6; .31; .03; .02 INJECTION, SOLUTION INTRAVENOUS at 10:06

## 2021-07-27 NOTE — H&P
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 and left long PIP joint     I personally obtained a written consent after risks benefits alternatives were discussed in detail with the patient         The patient verbalized understanding of exam findings and treatment plan  We engaged in the shared decision-making process and treatment options were discussed at length with the patient  Surgical and conservative management discussed today along with risks and benefits      Patient would like to proceed with right long and ring finger PIP mass excision  Patient would like to schedule surgery for May [address]     Diagnoses and all orders for this visit:     Pain in both hands  -     XR hand 3+ vw right; Future  -     XR hand 3+ vw left; Future     Ganglion cyst of finger of left hand     Ganglion cyst of finger of right hand           Follow Up:  Return for Post op      To Do Next Visit:  Sutures out     Operative Discussions:  Ganglion Cyst Excision: The anatomy and physiology of the ganglion was discussed with the patient today in the office  Fluid leaking out of the joint surface typically creates a small sac, which can enlarge and cause pain or limitation of motion  Treatment options include observation, aspiration, or surgical incision were discussed with the patient today  Observation typically lead to resolution and approximately 10% of patients, aspiration least resolution approximately 50% of patients, and surgical excision lead to resolution in approximately 97% of patients  After discussion with the patient today, the patient voiced understanding of all treatment options  The patient has elected excision of the ganglion  The risks and benefits of the procedure were explained to the patient, which include, but are not limited to: Bleeding, infection, recurrence, pain, scar, damage to tendons, damage to nerves, and damage to blood vessels, failure to give desired results and complications related to anesthesia  These risks, along with alternative conservative treatment options, and postoperative protocols were voiced back and understood by the patient  All questions were answered to the patient's satisfaction  The patient agrees to comply with a standard postoperative protocol, and is willing to proceed  Education was provided via written and auditory forms  There were no barriers to learning  Written handouts regarding wound care, incision and scar care, and general preoperative information was provided to the patient  Prior to surgery, the patient may be requested to stop all anti-inflammatory medications  Prophylactic aspirin, Plavix, and Coumadin may be allowed to be continued  Medications including vitamin E , ginkgo, and fish oil are requested to be stopped approximately one week prior to surgery  Hypertensive medications and beta blockers, if taken, should be continued         ____________________________________________________________________________________________________________________________________________        CHIEF COMPLAINT:      Chief Complaint   Patient presents with    Right Hand - Pain    Left Hand - Pain         SUBJECTIVE:  Maribel Barclay is a 63y o  year old RHD female who presents today reporting "lumps" to PIP joints on right ring and long fingers and left long finger  Patient denies any injury  She states she has had them for 10+ years  She reports increased pain with gripping activities  She denies any numbness and tingling         Pain/symptom timing:  Worse during the day when active  Pain/symptom context:  Worse with activites and work  Pain/symptom modifying factors:  Rest makes better, activities make worse  Pain/symptom associated signs/symptoms: none     Prior treatment   · NSAIDsNo   · Injections No   · Bracing/Orthotics No    Physical Therapy No      I have personally reviewed all the relevant PMH, PSH, SH, FH, Medications and allergies        PAST MEDICAL HISTORY:       Past Medical History:   Diagnosis Date    Anesthesia complication       "Slow to Wake up" post op    Nonadherence to medical treatment       No show for 2 pelvic floor PT appt         PAST SURGICAL HISTORY:        Past Surgical History:   Procedure Laterality Date    ECTROPIAN REPAIR Bilateral 2/23/2021     Procedure: ECTROPION REPAIR LLOU, LATERAL CANTHOPLASTY LLOU, ADJ TISSUE REARRANGEMENT CHEEK OU, ADJ TISSUE REARRANGEMENT LID OU W/LASER;  Surgeon: Coreen Nixon MD;  Location: [address] [place] [address];  Service: Ophthalmology    FACELIFT         Mini-Done under local in Office    [place] COLONOSCOPY FLX DX W/COLLJ Ashley 1978 PFRMD N/A 8/23/2018     Procedure: COLONOSCOPY;  Surgeon: Angela Youngblood MD;  [facility]; Service: Gastroenterology    TUBAL LIGATION        WISDOM TOOTH EXTRACTION             FAMILY HISTORY:        Family History   Problem Relation Age of Onset    Heart attack Father 72    No Known Problems Mother      No Known Problems Sister      No Known Problems Brother      No Known Problems Daughter      No Known Problems Son      No Known Problems Sister      No Known Problems Sister           SOCIAL HISTORY:  Social History           Tobacco Use    Smoking status: Never Smoker    Smokeless tobacco: Never Used   Substance Use Topics    Alcohol use: Not Currently    Drug use:  No         MEDICATIONS:     Current Outpatient Medications:     Ascorbic Acid (vitamin C) 1000 MG tablet, Take 1,000 mg by mouth daily Will increase dose to 2000 mg day before surgery, Disp: , Rfl:     cetirizine (ZyrTEC) 10 mg tablet, Take 5 mg by mouth as needed Takes 1/2 tablet = 5mg, Disp: , Rfl:     dextromethorphan-guaifenesin (MUCINEX DM)  MG per 12 hr tablet, Take 1 tablet by mouth 2 (two) times a day as needed for cough, Disp: 30 tablet, Rfl: 0    fluticasone (FLONASE) 50 mcg/act nasal spray, 2 sprays into each nostril daily (Patient taking differently: 2 sprays into each nostril as needed ), Disp: 16 g, Rfl: 0    Misc Natural Products (Curcumax Pro) TABS, Take 2 tablets by mouth 3 (three) times a day Start day before surgery, Disp: , Rfl:     Neomycin-Polymyxin-Dexameth (Maxitrol) 0 1 % OINT, Apply to eye Post op, Disp: , Rfl:     valACYclovir (VALTREX) 1,000 mg tablet, 1,000 mg Start day before surgery and continue till completed, Disp: , Rfl:      ALLERGIES:       Allergies   Allergen Reactions    Codeine GI Intolerance and Abdominal Pain               REVIEW OF SYSTEMS:  Review of Systems   Constitutional: Negative for chills and fever  HENT: Negative for drooling and sneezing  Eyes: Negative for redness  Respiratory: Negative for cough and wheezing  Gastrointestinal: Negative for nausea and vomiting  Musculoskeletal:        Please see ortho exam   Psychiatric/Behavioral: Negative for behavioral problems   The patient is not nervous/anxious           VITALS:      Vitals:     03/03/21 1405   BP: 120/74   Pulse: 71         LABS:  HgA1c: No results found for: HGBA1C  BMP:         Lab Results   Component Value Date     GLUCOSE 108 10/01/2015     CALCIUM 9 8 02/04/2021      10/01/2015     K 4 1 02/04/2021     CO2 31 02/04/2021      02/04/2021     BUN 24 02/04/2021     CREATININE 0 82 02/04/2021         _____________________________________________________  PHYSICAL EXAMINATION:  General: well developed and well nourished, alert, oriented times 3 and appears comfortable  Psychiatric: Normal  HEENT: Normocephalic, Atraumatic Trachea Midline, No torticollis  Pulmonary: No audible wheezing or respiratory distress   Cardiovascular: No pitting edema, 2+ radial pulse   Skin:  No Erythema, No Lacerations, No Fluctuation  Neurovascular: Sensation Intact to the Median, Ulnar, Radial Nerve, Motor Intact to the Median, Ulnar, Radial Nerve and Pulses Intact  Musculoskeletal: Normal, except as noted in detailed exam and in HPI         MUSCULOSKELETAL EXAMINATION:  Right long and ring fingers PIP joint  Positive cyst like structure on the PIP joints measuring 5x2mm  Skin is not translucent  There is not skin thinning  Skin is intact  No signs of infection        Left long PIP joint  Very small Positive cyst like structure  Skin is not translucent  There is not skin thinning  Skin is intact    No signs of infection        ___________________________________________________  STUDIES REVIEWED:  I have personally reviewed AP lateral and oblique radiographs of bilateral hands which demonstrate no fracture or dislocation, cyst present distal middle phalanx of of the right long finger              PROCEDURES PERFORMED:  Procedures  No Procedures performed today

## 2021-07-27 NOTE — OP NOTE
OPERATIVE REPORT  PATIENT NAME: Maribel Barclay  :  1960  MRN: [id number]  Pt Location: [facility]    SURGERY DATE: 21    Surgeon(s) and Role:     * Erika Burnham MD - Primary     * Nakul Menard PA-C - Assisting    Pre-Op Diagnosis:  Ganglion of flexor tendon sheath of right middle finger [M67 441]  Ganglion of flexor tendon sheath of right ring finger [M67 441]    Post-Op Diagnosis Codes:    Solid masses of the ring and long fingers intimately associated with the skin and the extensor tendon  No hypervascularity    Procedure(s):  EXCISION BIOPSY LONG AND RING FINGER MASS (Right)    Specimen(s):  Order Name Source Comment Collection Info Order Time   TISSUE EXAM Soft Tissue, Other  Collected By: Erika Burnham MD 2021 10:17 AM     Release to patient through Mychart   Immediate            Estimated Blood Loss:   Minimal    Anesthesia Type:   Conscious Sedation     IMPLANTS:  * No implants in log *    PERIOPERATIVE ANTIBIOTICS:    cefazolin, 1 gram    Tourniquet Time: 39 min at 250 mmHg          Operative Indications: The patient has a history of right ring and long dorsal PIP joint masses that was recalcitrant to conservative management  The decision was made to bring the patient to the operating room for right ring and long dorsal PIP joint mass excision Risks of the procedure were explained which include, but are not limited to bleeding; infection; damage to nerves, arteries,veins, tendons; scar; pain; need for reoperation; failure to give desired result; and risks of anaesthesia  All questions were answered to satisfaction and they were willing to proceed           Operative Findings:  Solid masses intimately associated with the skin and extensor mechanism overlying the PIP joint  No cystic component  Fibrous in appearance    Complications:   None    Procedure and Technique:  After the patient, site, and procedure were identified, the patient was brought into the operating room in a supine position  Local anaesthesia and sedation were provided  A well padded tourniquet was applied to the extremity, set at 250 mmHg  The  right upper extremity was then prepped and drapped in a normal, sterile, orthopedic fashion  A curvilinear incision was made transversely in line with the proximal most finger crease on both the long and ring fingers  The ring finger was addressed 1st and full-thickness skin flaps were elevated under loupe magnification  Superficial neurovascular structures were identified and protected  Mass was not readily identified bites difference in appearance of but there was a thickening around the area of the extensor tendon  A split was made in this overlying inflammatory tissue and a solid fibrotic appearing mass was noted approximately 3 x 3 mm  This was shelled out from surrounding inflammatory tissue and excised parallel to the extensor mechanism which was left undisturbed  The inflammatory tissue was resected back and  on further extension it did appear that part of the mass was within the extensor tendon which was sharply excised with a 64 blade and sent as part of the specimen for the ring finger for surgical pathologic evaluation the wound was copiously irrigated and the extensor mechanism closed with a Monocryl suture    The long finger was addressed next Full-thickness flaps were elevated being careful to protect superficial neurovascular structures  Again the mass was encased in some overlying scar tissue which was incised and the mass which is approximately 2 x 3 mm excised  On the long finger did seem to be slightly more involvement of the extensor tendon and the mass was fully resected  A small split in the extensor mechanism was then repaired using a 5 0 Monocryl  Competence of the extensor mechanism was again verified and the mass was sent for surgical pathologic evaluation    At the completion of the procedure, hemostasis was obtained with cautery and direct pressure  The wounds were copiously irrigated with sterile solution  The wounds were closed with Prolene  Sterile dressings were applied, including Xeroform, Gauze and Finger Dressing  Please note, all sponge, needle, and instrument counts were correct prior to closure  Loupe magnification was utilized  The patient tolerated the procedure well       I was present for the entire procedure, A qualified resident physician was not available and A physician assistant was required during the procedure for retraction tissue handling,dissection and suturing    Patient Disposition:  PACU     SIGNATURE: Eliza Wang MD  DATE: 07/27/21  TIME: 12:26 PM

## 2021-07-27 NOTE — ANESTHESIA PREPROCEDURE EVALUATION
Procedure:  EXCISION BIOPSY TISSUE LESION/MASS UPPER EXTREMITY (Right Finger)    Relevant Problems   CARDIO   (+) Hyperlipidemia      PULMONARY   (+) Upper respiratory tract infection        Physical Exam    Airway    Mallampati score: II  TM Distance: >3 FB  Neck ROM: full     Dental   No notable dental hx     Cardiovascular  Cardiovascular exam normal    Pulmonary  Pulmonary exam normal     Other Findings        Anesthesia Plan  ASA Score- 2     Anesthesia Type- IV sedation with anesthesia with ASA Monitors  Additional Monitors:   Airway Plan:           Plan Factors-Exercise tolerance (METS): >4 METS  Chart reviewed  Imaging results reviewed  Existing labs reviewed  Patient summary reviewed  Patient is not a current smoker  Induction-     Postoperative Plan-     Informed Consent- Anesthetic plan and risks discussed with patient  I personally reviewed this patient with the CRNA  Discussed and agreed on the Anesthesia Plan with the CRNA  Lucas Mae

## 2021-07-27 NOTE — ANESTHESIA POSTPROCEDURE EVALUATION
Post-Op Assessment Note    CV Status:  Stable  Pain Score: 0    Pain management: adequate     Mental Status:  Alert and awake   Hydration Status:  Euvolemic   PONV Controlled:  Controlled   Airway Patency:  Patent      Post Op Vitals Reviewed: Yes      Staff: CRNA         No complications documented      BP   101/67   Temp      Pulse  77   Resp   18  99 on 3LNC   SpO2

## 2021-07-27 NOTE — DISCHARGE INSTRUCTIONS
Post Operative Instructions    You have had surgery on your arm today, please read and follow the information below:  · Elevate your hand above your elbow during the next 24-48 hours to help with swelling  · Place your hand and arm over your head with motion at your shoulder three times a day  · Do not apply any cream/ointment/oil to your incisions including antibiotics  · Do not soak your hands in standing water (dishwater, tubs, Jacuzzi's, pools, etc ) until given permission (typically 2-3 weeks after injury)    Call the office if you notice any:  · Increased numbness or tingling of your hand or fingers that is not relieved with elevation  · Increasing pain that is not controlled with medication  · Difficulty chewing, breathing, swallowing  · Chest pains or shortness of breath  · Fever over 101 4 degrees  Bandage: Please keep bandages clean and dry  Remove bandage after 7 days  Once bandages are removed you may wash hands with soap and water  Short showers are okay as well, but please avoid soaking the hand as described above (ie no pools, baths, dirty dish water, hot tubs, ocean/lake water, etc)  Sutures will be removed in the office at your first follow up visit, please do not remove them yourself  Please do NOT put any topical agents on the surgical wound including neosporin, peroxide, tea tree oil, vitamin E, etc  as these can delay wound healing  Motion: Move fingers into a fist 5 times a day, DO NOT move any splinted fingers  Weight bearing status: Avoid heavy lifting (>5 pounds) with the extremity that was operated on until follow up appointment  Normal activities of daily living are OK  Ice: Ice for 10 minutes every hour as needed for swelling x 24 hours  Sling: No sling necessary      Pain medication:   Naproxen 220 mg two time a day (do not take this medication if you were told by your doctor that you cannot take anti-inflammatories or NSAIDS)  Tylenol Extended Release 650 mg every 8 hours  Norco/Hydrocodone one tab every 6 hours ONLY AS NEEDED for severe pain         Follow-up Appointment: 10-14 days with Dr Albina Longo        Please call the office if you have any questions or concerns regarding your post-operative care

## 2021-08-09 ENCOUNTER — OFFICE VISIT (OUTPATIENT)
Dept: OBGYN CLINIC | Facility: CLINIC | Age: 61
End: 2021-08-09

## 2021-08-09 VITALS
HEART RATE: 72 BPM | HEIGHT: 64 IN | DIASTOLIC BLOOD PRESSURE: 82 MMHG | WEIGHT: 117 LBS | BODY MASS INDEX: 19.97 KG/M2 | SYSTOLIC BLOOD PRESSURE: 121 MMHG

## 2021-08-09 DIAGNOSIS — M67.441 GANGLION CYST OF FINGER OF RIGHT HAND: Primary | ICD-10-CM

## 2021-08-09 PROCEDURE — 99024 POSTOP FOLLOW-UP VISIT: CPT | Performed by: SURGERY

## 2021-08-09 NOTE — PROGRESS NOTES
Assessment:   S/P: Excisional biopsy of dorsal ring and long finger masses, DOS 7/27/2021    Plan:   - Begin scar massage  - Continue to work on ROM  - fu 3 weeks for repeat eval  - path reviewed showing benign soft tissue lesion    To Do Next Visit:  eval progress      CHIEF COMPLAINT:  Chief Complaint   Patient presents with    Right Hand - Post-op         SUBJECTIVE:  Tc Joe is a 63y o  year old female who presents for follow up after right long and ring dorsal masses  Overall she is doing well and is happy with her procedure  She has some sensitivity around the incisions and involving the sutures  She reports improved motion of the fingers already       PHYSICAL EXAMINATION:  General: well developed and well nourished, alert, oriented times 3 and appears comfortable  Psychiatric: Normal    MUSCULOSKELETAL EXAMINATION:  Incision: Clean, dry, intact  Range of Motion: As expected  Neurovascular status: Neuro intact, good cap refill  Done today: Sutures out      STUDIES REVIEWED:  No Studies to review      PROCEDURES PERFORMED:  Procedures

## 2021-08-31 ENCOUNTER — VBI (OUTPATIENT)
Dept: ADMINISTRATIVE | Facility: OTHER | Age: 61
End: 2021-08-31

## 2021-09-02 ENCOUNTER — OFFICE VISIT (OUTPATIENT)
Dept: OBGYN CLINIC | Facility: CLINIC | Age: 61
End: 2021-09-02

## 2021-09-02 VITALS
HEART RATE: 76 BPM | BODY MASS INDEX: 19.97 KG/M2 | HEIGHT: 64 IN | DIASTOLIC BLOOD PRESSURE: 67 MMHG | WEIGHT: 117 LBS | SYSTOLIC BLOOD PRESSURE: 108 MMHG | RESPIRATION RATE: 17 BRPM

## 2021-09-02 DIAGNOSIS — M67.441 GANGLION CYST OF FINGER OF RIGHT HAND: Primary | ICD-10-CM

## 2021-09-02 PROCEDURE — 99024 POSTOP FOLLOW-UP VISIT: CPT | Performed by: SURGERY

## 2021-09-02 NOTE — PROGRESS NOTES
Assessment/Plan:  Patient ID: Cesar Fisher 63 y o  female   Surgery: Excision Biopsy Long And Ring Finger Mass - Right  Date of Surgery: 7/27/2021    Overall the patient is very happy with her outcome  She has pain-free range of motion at this time and can participate in all activities including golf with no pain  We encouraged the patient to continue her scar massage over the dorsal aspect of the long and ring fingers  Reminder to use sunscreen over the incisions  Follow Up:  6  week(s)    To Do Next Visit:    Re-evaluate      CHIEF COMPLAINT:  Chief Complaint   Patient presents with    Right Hand - Follow-up         SUBJECTIVE:  Cesar Fisher is a 63y o  year old female who presents for follow up after Excision Biopsy Long And Ring Finger Mass - Right  Patient has been continuing her scar massage over the PIP joints  She notes that there are small opening in goes around it  Overall she is very happy with her outcome and can  her golf club without any pain  Today patient has No Complaints         PHYSICAL EXAMINATION:  General: well developed and well nourished, alert, oriented times 3 and appears comfortable  Psychiatric: Normal    MUSCULOSKELETAL EXAMINATION:  Incision: Clean, dry, intact and healed  Surgery Site: normal, no evidence of infection   Range of Motion: As expected, opposition intact and full composite fist possible  Neurovascular status: Neuro intact, good cap refill and radial pulse 2+  Activity Restrictions: No restrictions        STUDIES REVIEWED:  No Studies to review      PROCEDURES PERFORMED:  Procedures  No Procedures performed today    Scribe Attestation    I,:  Nasrin Alcantara am acting as a scribe while in the presence of the attending physician :       I,:  Soni Francois MD personally performed the services described in this documentation    as scribed in my presence :

## 2021-11-01 ENCOUNTER — VBI (OUTPATIENT)
Dept: ADMINISTRATIVE | Facility: OTHER | Age: 61
End: 2021-11-01

## 2022-03-04 ENCOUNTER — APPOINTMENT (OUTPATIENT)
Dept: LAB | Facility: CLINIC | Age: 62
End: 2022-03-04
Payer: COMMERCIAL

## 2022-03-04 DIAGNOSIS — Z00.00 HEALTHCARE MAINTENANCE: ICD-10-CM

## 2022-03-04 DIAGNOSIS — E55.9 VITAMIN D DEFICIENCY: ICD-10-CM

## 2022-03-04 LAB
25(OH)D3 SERPL-MCNC: 13.6 NG/ML (ref 30–100)
ALBUMIN SERPL BCP-MCNC: 4.4 G/DL (ref 3.5–5)
ALP SERPL-CCNC: 74 U/L (ref 46–116)
ALT SERPL W P-5'-P-CCNC: 25 U/L (ref 12–78)
ANION GAP SERPL CALCULATED.3IONS-SCNC: 4 MMOL/L (ref 4–13)
AST SERPL W P-5'-P-CCNC: 24 U/L (ref 5–45)
BACTERIA UR QL AUTO: ABNORMAL /HPF
BASOPHILS # BLD AUTO: 0.01 THOUSANDS/ΜL (ref 0–0.1)
BASOPHILS NFR BLD AUTO: 0 % (ref 0–1)
BILIRUB SERPL-MCNC: 0.81 MG/DL (ref 0.2–1)
BILIRUB UR QL STRIP: NEGATIVE
BUN SERPL-MCNC: 19 MG/DL (ref 5–25)
CALCIUM SERPL-MCNC: 10 MG/DL (ref 8.3–10.1)
CHLORIDE SERPL-SCNC: 106 MMOL/L (ref 100–108)
CHOLEST SERPL-MCNC: 227 MG/DL
CLARITY UR: CLEAR
CO2 SERPL-SCNC: 29 MMOL/L (ref 21–32)
COLOR UR: YELLOW
CREAT SERPL-MCNC: 0.76 MG/DL (ref 0.6–1.3)
EOSINOPHIL # BLD AUTO: 0.1 THOUSAND/ΜL (ref 0–0.61)
EOSINOPHIL NFR BLD AUTO: 4 % (ref 0–6)
ERYTHROCYTE [DISTWIDTH] IN BLOOD BY AUTOMATED COUNT: 12.4 % (ref 11.6–15.1)
GFR SERPL CREATININE-BSD FRML MDRD: 84 ML/MIN/1.73SQ M
GLUCOSE P FAST SERPL-MCNC: 90 MG/DL (ref 65–99)
GLUCOSE UR STRIP-MCNC: NEGATIVE MG/DL
HCT VFR BLD AUTO: 42.6 % (ref 34.8–46.1)
HDLC SERPL-MCNC: 112 MG/DL
HGB BLD-MCNC: 14.9 G/DL (ref 11.5–15.4)
HGB UR QL STRIP.AUTO: NEGATIVE
IMM GRANULOCYTES # BLD AUTO: 0 THOUSAND/UL (ref 0–0.2)
IMM GRANULOCYTES NFR BLD AUTO: 0 % (ref 0–2)
KETONES UR STRIP-MCNC: NEGATIVE MG/DL
LDLC SERPL CALC-MCNC: 107 MG/DL (ref 0–100)
LEUKOCYTE ESTERASE UR QL STRIP: NEGATIVE
LYMPHOCYTES # BLD AUTO: 0.77 THOUSANDS/ΜL (ref 0.6–4.47)
LYMPHOCYTES NFR BLD AUTO: 29 % (ref 14–44)
MCH RBC QN AUTO: 30.5 PG (ref 26.8–34.3)
MCHC RBC AUTO-ENTMCNC: 35 G/DL (ref 31.4–37.4)
MCV RBC AUTO: 87 FL (ref 82–98)
MONOCYTES # BLD AUTO: 0.25 THOUSAND/ΜL (ref 0.17–1.22)
MONOCYTES NFR BLD AUTO: 10 % (ref 4–12)
MUCOUS THREADS UR QL AUTO: ABNORMAL
NEUTROPHILS # BLD AUTO: 1.51 THOUSANDS/ΜL (ref 1.85–7.62)
NEUTS SEG NFR BLD AUTO: 57 % (ref 43–75)
NITRITE UR QL STRIP: NEGATIVE
NON-SQ EPI CELLS URNS QL MICRO: ABNORMAL /HPF
NONHDLC SERPL-MCNC: 115 MG/DL
NRBC BLD AUTO-RTO: 0 /100 WBCS
PH UR STRIP.AUTO: 6.5 [PH]
PLATELET # BLD AUTO: 171 THOUSANDS/UL (ref 149–390)
PMV BLD AUTO: 10 FL (ref 8.9–12.7)
POTASSIUM SERPL-SCNC: 4.3 MMOL/L (ref 3.5–5.3)
PROT SERPL-MCNC: 6.8 G/DL (ref 6.4–8.2)
PROT UR STRIP-MCNC: ABNORMAL MG/DL
RBC # BLD AUTO: 4.88 MILLION/UL (ref 3.81–5.12)
RBC #/AREA URNS AUTO: ABNORMAL /HPF
SODIUM SERPL-SCNC: 139 MMOL/L (ref 136–145)
SP GR UR STRIP.AUTO: 1.03 (ref 1–1.03)
TRIGL SERPL-MCNC: 40 MG/DL
UROBILINOGEN UR STRIP-ACNC: <2 MG/DL
WBC # BLD AUTO: 2.64 THOUSAND/UL (ref 4.31–10.16)
WBC #/AREA URNS AUTO: ABNORMAL /HPF

## 2022-03-04 PROCEDURE — 82306 VITAMIN D 25 HYDROXY: CPT

## 2022-03-04 PROCEDURE — 36415 COLL VENOUS BLD VENIPUNCTURE: CPT

## 2022-03-04 PROCEDURE — 81001 URINALYSIS AUTO W/SCOPE: CPT

## 2022-03-04 PROCEDURE — 85025 COMPLETE CBC W/AUTO DIFF WBC: CPT

## 2022-03-04 PROCEDURE — 80053 COMPREHEN METABOLIC PANEL: CPT

## 2022-03-04 PROCEDURE — 80061 LIPID PANEL: CPT

## 2022-03-09 ENCOUNTER — OFFICE VISIT (OUTPATIENT)
Dept: INTERNAL MEDICINE CLINIC | Facility: CLINIC | Age: 62
End: 2022-03-09
Payer: COMMERCIAL

## 2022-03-09 VITALS
DIASTOLIC BLOOD PRESSURE: 80 MMHG | BODY MASS INDEX: 20.49 KG/M2 | HEART RATE: 76 BPM | SYSTOLIC BLOOD PRESSURE: 140 MMHG | WEIGHT: 120 LBS | RESPIRATION RATE: 17 BRPM | HEIGHT: 64 IN | OXYGEN SATURATION: 98 % | TEMPERATURE: 97 F

## 2022-03-09 DIAGNOSIS — H61.22 IMPACTED CERUMEN OF LEFT EAR: Primary | ICD-10-CM

## 2022-03-09 DIAGNOSIS — Z00.00 HEALTHCARE MAINTENANCE: ICD-10-CM

## 2022-03-09 DIAGNOSIS — E78.01 FAMILIAL HYPERCHOLESTEROLEMIA: ICD-10-CM

## 2022-03-09 PROCEDURE — 99396 PREV VISIT EST AGE 40-64: CPT | Performed by: INTERNAL MEDICINE

## 2022-03-09 PROCEDURE — 3008F BODY MASS INDEX DOCD: CPT | Performed by: INTERNAL MEDICINE

## 2022-03-09 NOTE — ASSESSMENT & PLAN NOTE
Patient does have a history of vitamin-D deficiency  She has attempted to take vitamin-D supplements but states that this causes severe stomach upset and constipation  In the spring and summer she does get some exposure to sun but we did reinforce the importance of using sunscreen    Did suggest supplementing her diet with foods that are higher in vitamin-D

## 2022-03-09 NOTE — ASSESSMENT & PLAN NOTE
Patient is a 25-year-old female history no major medical problems in the past who is here today for routine physical   She did have extensive lab testing performed prior to the visit today and we did discuss the results of length with the patient  Patient states she is doing well  May be undergoing procedure the help with the vision in her eyes  Does relate that she did have a COVID virus infection and extremely mild symptoms now has completely recovered  Only complaint today is some problems with hearing in her left ear  Patient did undergo physical exam today in the office  She is up-to-date with all routine screening including gynecologic care  A we did attempt to disimpact cerumen to her left external canal which was unsuccessful  Will see ENT physician regarding this  Patient will be seen again in the office in approximately 1 year for re-evaluation    She was told in the interim if there is any new medical problems or concerns to please call

## 2022-03-09 NOTE — PROGRESS NOTES
Assessment/Plan:    Healthcare maintenance  Patient is a 69-year-old female history no major medical problems in the past who is here today for routine physical   She did have extensive lab testing performed prior to the visit today and we did discuss the results of length with the patient  Patient states she is doing well  May be undergoing procedure the help with the vision in her eyes  Does relate that she did have a COVID virus infection and extremely mild symptoms now has completely recovered  Only complaint today is some problems with hearing in her left ear  Patient did undergo physical exam today in the office  She is up-to-date with all routine screening including gynecologic care  A we did attempt to disimpact cerumen to her left external canal which was unsuccessful  Will see ENT physician regarding this  Patient will be seen again in the office in approximately 1 year for re-evaluation  She was told in the interim if there is any new medical problems or concerns to please call    Vitamin D deficiency  Patient does have a history of vitamin-D deficiency  She has attempted to take vitamin-D supplements but states that this causes severe stomach upset and constipation  In the spring and summer she does get some exposure to sun but we did reinforce the importance of using sunscreen  Did suggest supplementing her diet with foods that are higher in vitamin-D  Hyperlipidemia  Patient's cholesterol is high the patient does have a very high HDL cholesterol minimal elevation LDL cholesterol  We did discuss with the patient making sure she watches her intake of fats and cholesterol with her diet  Diagnoses and all orders for this visit:    Impacted cerumen of left ear  -     Ambulatory Referral to Otolaryngology; Future    Healthcare maintenance    Familial hypercholesterolemia          Subjective:      Patient ID: Adan Green is a 57 y o  female      Patient is a 60 to her female history of medical problems as outlined previously was here today for routine physical   She did have labs performed prior to the visit today we did discuss the results at length with the patient  Patient states in general she is feeling well completely recovered from her COVID virus infection previously  She is having some difficulties with hearing in her left ear but no other major complaints      The following portions of the patient's history were reviewed and updated as appropriate:   She  has a past medical history of Anesthesia complication and Nonadherence to medical treatment  She   Patient Active Problem List    Diagnosis Date Noted    Impacted cerumen of left ear 03/09/2022    Ganglion of flexor tendon sheath of right ring finger 07/27/2021    Ganglion of flexor tendon sheath of right middle finger 07/27/2021    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    Mechanical ectropion of left lower eyelid 02/23/2021    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 02/23/2021    Other specified disorders of eyelid 02/23/2021    Exposure keratoconjunctivitis, bilateral 02/23/2021    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    Mechanical lagophthalmos left lower eyelid 02/23/2021    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    Encntr for gyn exam (general) (routine) w abnormal findings 05/07/2019    Vaginal atrophy 05/07/2019    Healthcare maintenance 05/07/2019    Colon cancer screening 07/27/2018    Upper respiratory tract infection 07/26/2018    Vitamin D deficiency 10/16/2015    Fatigue 12/09/2014    Hyperlipidemia 12/09/2014     She  has a past surgical history that includes Tubal ligation; pr colonoscopy flx dx w/collj spec when pfrmd (N/A, 8/23/2018); [place] tooth extraction; Facelift; Ectropian Repair (Bilateral, 2/23/2021); and Mass excision (Right, 7/27/2021)    Her family history includes Heart attack (age of onset: 72) in her father; No Known Problems in her brother, daughter, mother, sister, sister, sister, and son  She  reports that she has never smoked  She has never used smokeless tobacco  She reports previous alcohol use  She reports that she does not use drugs  Current Outpatient Medications   Medication Sig Dispense Refill    Ascorbic Acid (vitamin C) 1000 MG tablet Take 1,000 mg by mouth daily Will increase dose to 2000 mg day before surgery      cetirizine (ZyrTEC) 10 mg tablet Take 5 mg by mouth as needed Takes 1/2 tablet = 5mg      fluticasone (FLONASE) 50 mcg/act nasal spray 2 sprays into each nostril daily 16 g 0    Misc Natural Products (Curcumax Pro) TABS Take 2 tablets by mouth 3 (three) times a day Start day before surgery      Neomycin-Polymyxin-Dexameth (Maxitrol) 0 1 % OINT Apply to eye Post op      dextromethorphan-guaifenesin (MUCINEX DM)  MG per 12 hr tablet Take 1 tablet by mouth 2 (two) times a day as needed for cough (Patient not taking: Reported on 9/2/2021) 30 tablet 0    ondansetron (ZOFRAN) 4 mg tablet Take 1 tablet (4 mg total) by mouth every 8 (eight) hours as needed for nausea or vomiting (Patient not taking: Reported on 9/2/2021) 20 tablet 0    valACYclovir (VALTREX) 1,000 mg tablet 1,000 mg Start day before surgery and continue till completed (Patient not taking: Reported on 9/2/2021)       No current facility-administered medications for this visit       Current Outpatient Medications on File Prior to Visit   Medication Sig    Ascorbic Acid (vitamin C) 1000 MG tablet Take 1,000 mg by mouth daily Will increase dose to 2000 mg day before surgery    cetirizine (ZyrTEC) 10 mg tablet Take 5 mg by mouth as needed Takes 1/2 tablet = 5mg    fluticasone (FLONASE) 50 mcg/act nasal spray 2 sprays into each nostril daily    Misc Natural Products (Curcumax Pro) TABS Take 2 tablets by mouth 3 (three) times a day Start day before surgery    Neomycin-Polymyxin-Dexameth (Maxitrol) 0 1 % OINT Apply to eye Post op    dextromethorphan-guaifenesin (MUCINEX DM)  MG per 12 hr tablet Take 1 tablet by mouth 2 (two) times a day as needed for cough (Patient not taking: Reported on 9/2/2021)    ondansetron (ZOFRAN) 4 mg tablet Take 1 tablet (4 mg total) by mouth every 8 (eight) hours as needed for nausea or vomiting (Patient not taking: Reported on 9/2/2021)    valACYclovir (VALTREX) 1,000 mg tablet 1,000 mg Start day before surgery and continue till completed (Patient not taking: Reported on 9/2/2021)     No current facility-administered medications on file prior to visit  She is allergic to codeine       Review of Systems   Constitutional: Negative  HENT: Positive for hearing loss and postnasal drip  Negative for congestion, dental problem, drooling, ear discharge, ear pain, facial swelling, mouth sores, nosebleeds, rhinorrhea, sinus pressure, sinus pain, sneezing, sore throat, tinnitus, trouble swallowing and voice change  Eyes: Negative  Respiratory: Negative  Cardiovascular: Negative  Gastrointestinal: Negative  Endocrine: Negative  Genitourinary: Negative  Musculoskeletal: Negative  Skin: Negative  Allergic/Immunologic: Negative  Neurological: Negative  Hematological: Negative  Psychiatric/Behavioral: Negative  Objective:      /80 (BP Location: Left arm, Patient Position: Sitting, Cuff Size: Adult)   Pulse 76   Temp (!) 97 °F (36 1 °C) (Tympanic)   Resp 17   Ht 5' 4" (1 626 m)   Wt 54 4 kg (120 lb)   LMP  (LMP Unknown)   SpO2 98%   BMI 20 60 kg/m²          Physical Exam  Vitals and nursing note reviewed  Constitutional:       General: She is not in acute distress  Appearance: Normal appearance  She is normal weight  She is not ill-appearing, toxic-appearing or diaphoretic  Comments: Pleasant, healthy-appearing 69-year-old female who is awake alert no acute distress and oriented x3   HENT:      Head: Normocephalic and atraumatic        Right Ear: Tympanic membrane, ear canal and external ear normal  There is no impacted cerumen  Left Ear: Tympanic membrane and external ear normal  There is impacted cerumen  Ears:      Comments: Impacted cerumen to the left auditory canal   With gentle lavage and curette unable to disimpact the cerumen  To see ENT physician for evaluation     Nose: Nose normal  No congestion or rhinorrhea  Mouth/Throat:      Mouth: Mucous membranes are moist       Pharynx: Oropharynx is clear  No oropharyngeal exudate or posterior oropharyngeal erythema  Eyes:      General: No scleral icterus  Right eye: No discharge  Left eye: No discharge  Extraocular Movements: Extraocular movements intact  Conjunctiva/sclera: Conjunctivae normal       Pupils: Pupils are equal, round, and reactive to light  Neck:      Vascular: No carotid bruit  Cardiovascular:      Rate and Rhythm: Normal rate and regular rhythm  Pulses: Normal pulses  Heart sounds: Normal heart sounds  No murmur heard  No friction rub  No gallop  Pulmonary:      Effort: Pulmonary effort is normal  No respiratory distress  Breath sounds: Normal breath sounds  No stridor  No wheezing, rhonchi or rales  Chest:      Chest wall: No tenderness  Abdominal:      General: Abdomen is flat  Bowel sounds are normal  There is no distension  Palpations: Abdomen is soft  There is no mass  Tenderness: There is no abdominal tenderness  There is no right CVA tenderness, left CVA tenderness, guarding or rebound  Hernia: No hernia is present  Musculoskeletal:         General: No swelling, tenderness, deformity or signs of injury  Normal range of motion  Cervical back: Normal range of motion and neck supple  No rigidity or tenderness  Right lower leg: No edema  Left lower leg: No edema  Lymphadenopathy:      Cervical: No cervical adenopathy  Skin:     General: Skin is warm and dry        Capillary Refill: Capillary refill takes less than 2 seconds  Coloration: Skin is not jaundiced or pale  Findings: No bruising, erythema, lesion or rash  Neurological:      General: No focal deficit present  Mental Status: She is alert and oriented to person, place, and time  Mental status is at baseline  Cranial Nerves: No cranial nerve deficit  Sensory: No sensory deficit  Motor: No weakness  Coordination: Coordination normal       Gait: Gait normal       Deep Tendon Reflexes: Reflexes normal    Psychiatric:         Mood and Affect: Mood normal          Behavior: Behavior normal          Thought Content:  Thought content normal          Judgment: Judgment normal

## 2022-03-09 NOTE — ASSESSMENT & PLAN NOTE
Patient's cholesterol is high the patient does have a very high HDL cholesterol minimal elevation LDL cholesterol  We did discuss with the patient making sure she watches her intake of fats and cholesterol with her diet

## 2022-10-11 PROBLEM — H61.22 IMPACTED CERUMEN OF LEFT EAR: Status: RESOLVED | Noted: 2022-03-09 | Resolved: 2022-10-11

## 2022-10-12 PROBLEM — H16.213 EXPOSURE KERATOCONJUNCTIVITIS, BILATERAL: Status: RESOLVED | Noted: 2021-02-23 | Resolved: 2022-10-12

## 2022-10-12 PROBLEM — Z00.00 HEALTHCARE MAINTENANCE: Status: RESOLVED | Noted: 2019-05-07 | Resolved: 2022-10-12

## 2022-11-21 ENCOUNTER — ANNUAL EXAM (OUTPATIENT)
Dept: OBGYN CLINIC | Facility: CLINIC | Age: 62
End: 2022-11-21

## 2022-11-21 VITALS
BODY MASS INDEX: 20.52 KG/M2 | DIASTOLIC BLOOD PRESSURE: 80 MMHG | SYSTOLIC BLOOD PRESSURE: 110 MMHG | HEIGHT: 64 IN | WEIGHT: 120.2 LBS

## 2022-11-21 DIAGNOSIS — Z01.419 ENCNTR FOR GYN EXAM (GENERAL) (ROUTINE) W/O ABN FINDINGS: Primary | ICD-10-CM

## 2022-11-21 DIAGNOSIS — Z12.31 ENCOUNTER FOR SCREENING MAMMOGRAM FOR BREAST CANCER: ICD-10-CM

## 2022-11-21 DIAGNOSIS — N95.2 VAGINAL ATROPHY: ICD-10-CM

## 2022-11-21 NOTE — PATIENT INSTRUCTIONS
Calcium 1200-1500mg + 600-1000 IU Vit D daily  Exercise 150 minutes per week minimum including weight bearing exercises  Pap with high risk HPV Q 5 years, if normal   Due 2023  Annual mammogram ordered (past due) and monthly breast self exam recommended  Colonoscopy-due 2028    Kegels 20 times twice daily  Silicone based lubricant with sex  (Use water based lubricant with condoms or sexual toys )  Vaginal moisturizers twice weekly as needed  Return to office in one year or sooner, if needed

## 2022-11-21 NOTE — PROGRESS NOTES
Assessment/Plan:    Calcium 1200-1500mg + 600-1000 IU Vit D daily  Exercise 150 minutes per week minimum including weight bearing exercises  Pap with high risk HPV Q 5 years, if normal   Due   Annual mammogram ordered (past due) and monthly breast self exam recommended  Colonoscopy-due     Kegels 20 times twice daily  Silicone based lubricant with sex  (Use water based lubricant with condoms or sexual toys )    Vaginal moisturizers twice weekly as needed  Return to office in one year or sooner, if needed  1  Encntr for gyn exam (general) (routine) w/o abn findings    2  Encounter for screening mammogram for breast cancer  -     Mammo screening bilateral w 3d & cad; Future    3  Vaginal atrophy            Subjective:      Patient ID: Keke Waite is a 60 y o  female  HPI    Keke Waite is a 60 y o   female who is here today for her annual visit  No health concerns  LMP at age 54 with no vaginal bleeding since  Exercise- regularly  Keke Waite is sexually active with male partner/  of 12 years  Monogamous and feels safe in this relationship  Denies vaginal pain,bleeding or dryness  No dyspareunia since following with PF PT  She is not interested in STD screening today  She denies vaginal discharge, itching or pelvic pain  She has no  urinary concerns, does not have incontinence  No bowel concerns  No breast concerns  Last pap: 18 normal pap with negative HR HPV  DEXA scan: N/A  Mammogram: 19; past due   Colonoscopy: 18; 10 year recall    The following portions of the patient's history were reviewed and updated as appropriate: allergies, current medications, past family history, past medical history, past social history, past surgical history and problem list     Review of Systems   Constitutional: Negative  Negative for activity change, appetite change, chills, diaphoresis, fatigue, fever and unexpected weight change     HENT: Negative for congestion, dental problem, sneezing, sore throat and trouble swallowing  Eyes: Negative for visual disturbance  Respiratory: Negative for chest tightness and shortness of breath  Cardiovascular: Negative for chest pain and leg swelling  Gastrointestinal: Negative for abdominal pain, constipation, diarrhea, nausea and vomiting  Genitourinary: Negative for difficulty urinating, dyspareunia, dysuria, frequency, hematuria, menstrual problem, pelvic pain, urgency, vaginal bleeding, vaginal discharge and vaginal pain  Musculoskeletal: Negative for back pain and neck pain  Skin: Negative  Allergic/Immunologic: Negative  Neurological: Negative for weakness and headaches  Hematological: Negative for adenopathy  Psychiatric/Behavioral: Negative  Objective:      /80 (BP Location: Left arm, Patient Position: Sitting, Cuff Size: Standard)   Ht 5' 3 75" (1 619 m)   Wt 54 5 kg (120 lb 3 2 oz)   LMP  (LMP Unknown)   BMI 20 79 kg/m²          Physical Exam  Vitals and nursing note reviewed  Constitutional:       Appearance: Normal appearance  She is well-developed  HENT:      Head: Normocephalic and atraumatic  Eyes:      General:         Right eye: No discharge  Left eye: No discharge  Neck:      Thyroid: No thyromegaly  Trachea: Trachea normal    Cardiovascular:      Rate and Rhythm: Normal rate and regular rhythm  Heart sounds: Normal heart sounds  Pulmonary:      Effort: Pulmonary effort is normal       Breath sounds: Normal breath sounds  Chest:   Breasts:     Breasts are symmetrical       Right: Normal  No inverted nipple, mass, nipple discharge, skin change or tenderness  Left: Normal  No inverted nipple, mass, nipple discharge, skin change or tenderness  Abdominal:      Palpations: Abdomen is soft  Genitourinary:     General: Normal vulva  Exam position: Lithotomy position        Labia:         Right: No rash, tenderness, lesion or injury  Left: No rash, tenderness, lesion or injury  Urethra: No prolapse, urethral pain, urethral swelling or urethral lesion  Vagina: Normal  No signs of injury and foreign body  No vaginal discharge, erythema, tenderness or bleeding  Cervix: Normal       Uterus: Normal        Adnexa:         Right: No mass, tenderness or fullness  Left: No mass, tenderness or fullness  Rectum: No external hemorrhoid  Comments: Vaginal tone 4/5  Musculoskeletal:         General: Normal range of motion  Cervical back: Normal range of motion and neck supple  Lymphadenopathy:      Head:      Right side of head: No submental, submandibular or tonsillar adenopathy  Left side of head: No submental, submandibular or tonsillar adenopathy  Cervical: No cervical adenopathy  Upper Body:      Right upper body: No supraclavicular or axillary adenopathy  Left upper body: No supraclavicular or axillary adenopathy  Lower Body: No right inguinal adenopathy  No left inguinal adenopathy  Skin:     General: Skin is warm and dry  Neurological:      Mental Status: She is alert and oriented to person, place, and time     Psychiatric:         Mood and Affect: Mood normal          Behavior: Behavior normal

## 2023-03-07 ENCOUNTER — TELEPHONE (OUTPATIENT)
Dept: INTERNAL MEDICINE CLINIC | Facility: CLINIC | Age: 63
End: 2023-03-07

## 2023-03-07 DIAGNOSIS — E78.01 FAMILIAL HYPERCHOLESTEROLEMIA: ICD-10-CM

## 2023-03-07 DIAGNOSIS — E55.9 VITAMIN D DEFICIENCY: ICD-10-CM

## 2023-03-07 DIAGNOSIS — Z00.00 HEALTHCARE MAINTENANCE: Primary | ICD-10-CM

## 2023-03-07 DIAGNOSIS — R53.83 FATIGUE, UNSPECIFIED TYPE: ICD-10-CM

## 2023-03-07 NOTE — TELEPHONE ENCOUNTER
Patient has an upcoming physical  Can you please place blood work  She will have new insurance   Patient can be reached at [phone]

## 2023-04-19 PROBLEM — R73.9 HYPERGLYCEMIA: Status: ACTIVE | Noted: 2023-04-19

## 2023-04-25 ENCOUNTER — TELEPHONE (OUTPATIENT)
Dept: PSYCHIATRY | Facility: CLINIC | Age: 63
End: 2023-04-25

## 2023-04-25 NOTE — TELEPHONE ENCOUNTER
Patient called in requesting call back, writer called patient back and left msg for them to call back at their earliest convenience

## 2023-05-06 PROBLEM — Z00.00 HEALTHCARE MAINTENANCE: Status: RESOLVED | Noted: 2019-05-07 | Resolved: 2023-05-06

## 2023-05-24 ENCOUNTER — TELEPHONE (OUTPATIENT)
Dept: INTERNAL MEDICINE CLINIC | Facility: CLINIC | Age: 63
End: 2023-05-24

## 2023-09-18 ENCOUNTER — TELEPHONE (OUTPATIENT)
Dept: INTERNAL MEDICINE CLINIC | Facility: CLINIC | Age: 63
End: 2023-09-18

## 2023-10-24 ENCOUNTER — APPOINTMENT (OUTPATIENT)
Dept: LAB | Facility: CLINIC | Age: 63
End: 2023-10-24
Payer: COMMERCIAL

## 2023-10-24 DIAGNOSIS — Z00.00 HEALTHCARE MAINTENANCE: ICD-10-CM

## 2023-10-24 DIAGNOSIS — R73.9 HYPERGLYCEMIA: ICD-10-CM

## 2023-10-24 DIAGNOSIS — E55.9 VITAMIN D DEFICIENCY: ICD-10-CM

## 2023-10-24 LAB
25(OH)D3 SERPL-MCNC: 29 NG/ML (ref 30–100)
EST. AVERAGE GLUCOSE BLD GHB EST-MCNC: 105 MG/DL
GLUCOSE P FAST SERPL-MCNC: 105 MG/DL (ref 65–99)
HBA1C MFR BLD: 5.3 %

## 2023-10-24 PROCEDURE — 36415 COLL VENOUS BLD VENIPUNCTURE: CPT

## 2023-10-24 PROCEDURE — 83036 HEMOGLOBIN GLYCOSYLATED A1C: CPT

## 2023-10-24 PROCEDURE — 82947 ASSAY GLUCOSE BLOOD QUANT: CPT

## 2023-10-24 PROCEDURE — 82306 VITAMIN D 25 HYDROXY: CPT

## 2023-10-25 ENCOUNTER — OFFICE VISIT (OUTPATIENT)
Dept: INTERNAL MEDICINE CLINIC | Facility: CLINIC | Age: 63
End: 2023-10-25
Payer: COMMERCIAL

## 2023-10-25 VITALS
HEIGHT: 63 IN | BODY MASS INDEX: 21.09 KG/M2 | WEIGHT: 119 LBS | TEMPERATURE: 98.1 F | DIASTOLIC BLOOD PRESSURE: 76 MMHG | SYSTOLIC BLOOD PRESSURE: 116 MMHG | HEART RATE: 74 BPM

## 2023-10-25 DIAGNOSIS — E55.9 VITAMIN D DEFICIENCY: ICD-10-CM

## 2023-10-25 DIAGNOSIS — E78.01 FAMILIAL HYPERCHOLESTEROLEMIA: ICD-10-CM

## 2023-10-25 DIAGNOSIS — R73.9 HYPERGLYCEMIA: ICD-10-CM

## 2023-10-25 DIAGNOSIS — Z12.11 COLON CANCER SCREENING: ICD-10-CM

## 2023-10-25 DIAGNOSIS — R53.83 FATIGUE, UNSPECIFIED TYPE: Primary | ICD-10-CM

## 2023-10-25 DIAGNOSIS — Z00.00 HEALTHCARE MAINTENANCE: ICD-10-CM

## 2023-10-25 PROCEDURE — 99214 OFFICE O/P EST MOD 30 MIN: CPT | Performed by: INTERNAL MEDICINE

## 2023-10-25 NOTE — PROGRESS NOTES
Name: Evette Clarke      : 1960      MRN: [id number]  Encounter Provider: Peng Landeros DO  Encounter Date: 10/25/2023   Encounter department: [facility] INTERNAL MEDICINE    Assessment & Plan     1. Fatigue, unspecified type  Assessment & Plan:  States some of the fatigue may be from inadequate sleep at night. No snoring or witnessed apnea. Check thyroid function studies    Orders:  -     TSH, 3rd generation with Free T4 reflex; Future    2. Vitamin D deficiency  Assessment & Plan:  Vitamin D deficiency. She did have labs performed and vitamin D level is still slightly low at 29. Patient admits she is not always remembering to take her vitamin D as previously prescribed. Reminded again to take this on a daily basis and check a level again with the patient in 6 months. Orders:  -     Vitamin D 25 hydroxy; Future    3. Hyperglycemia  Assessment & Plan: We continue to monitor the patient's blood sugar readings which have been slightly elevated but no evidence of diabetes. She states he is not always perfect with her diet but we did discuss again the importance of watching her intake of concentrated sweets and simple carbohydrates. Check a fasting blood sugar hemoglobin A1c with her next visit. She continues to stay physically active    Orders:  -     Hemoglobin A1C; Future  -     TSH, 3rd generation with Free T4 reflex; Future  -     Hemoglobin A1C; Future    4. Familial hypercholesterolemia  Assessment & Plan:  Does have a history of hyperlipidemia, family history of elevated cholesterol profile but importantly also family history of very high HDL cholesterol. Continue to monitor cholesterol readings    Orders:  -     Lipid panel; Future    5. Healthcare maintenance  -     Comprehensive metabolic panel; Future  -     CBC and differential; Future  -     Lipid panel; Future  -     UA (URINE) with reflex to Scope;  Future; Expected date: 10/25/2023  -     Hemoglobin A1C; Future  - TSH, 3rd generation with Free T4 reflex; Future    6. Colon cancer screening  Assessment & Plan:  Did have labs performed prior to the visit but were still waiting Hemoccult testing. Will undergo further work-up and evaluation if positive. Depression Screening and Follow-up Plan: Patient was screened for depression during today's encounter. They screened negative with a PHQ-2 score of 0. Subjective     Patient is a 76-year-old female history of extensive medical problems outlined previously who is here today for routine follow-up. Patient relates in general doing well offers no new complaints. She did have labs performed prior to the visit today and she has already reviewed these online and we did discuss the results today in the office including abnormalities. Review of Systems   Constitutional: Negative. HENT: Negative. Eyes: Negative. Respiratory: Negative. Cardiovascular: Negative. Gastrointestinal: Negative. Endocrine: Negative. Genitourinary: Negative. Musculoskeletal: Negative. Skin: Negative. Allergic/Immunologic: Negative. Neurological: Negative. Hematological: Negative. Psychiatric/Behavioral: Negative.        Past Medical History:   Diagnosis Date   • Anesthesia complication     "Slow to Wake up" post op   • Nonadherence to medical treatment     No show for 2 pelvic floor PT appt     Past Surgical History:   Procedure Laterality Date   • ECTROPIAN REPAIR Bilateral 2/23/2021    Procedure: ECTROPION REPAIR LLOU, LATERAL CANTHOPLASTY LLOU, ADJ TISSUE REARRANGEMENT CHEEK OU, ADJ TISSUE REARRANGEMENT LID OU W/LASER;  Surgeon: Jeanine De Leon MD;  Location: [address] [place] [address] MAIN OR;  Service: Ophthalmology   • FACELIFT      Mini-Done under local in Office   • MASS EXCISION Right 7/27/2021    Procedure: EXCISION BIOPSY LONG AND RING FINGER MASS;  Surgeon: Paz Agee MD;  Location: [facility] MAIN OR;  Service: Orthopedics   • [place] COLONOSCOPY FLX DX W/COLLJ SPEC WHEN PFRMD N/A 8/23/2018    Procedure: COLONOSCOPY;  Surgeon: Allyson Johnson MD;  Location: [facility];   Service: Gastroenterology   • TUBAL LIGATION     • WISDOM TOOTH EXTRACTION       Family History   Problem Relation Age of Onset   • No Known Problems Mother    • Heart attack Father 72   • No Known Problems Sister    • No Known Problems Sister    • No Known Problems Sister    • No Known Problems Brother    • No Known Problems Daughter    • No Known Problems Son    • Arthritis Family    • Breast cancer Neg Hx    • Ovarian cancer Neg Hx    • Colon cancer Neg Hx      Social History     Socioeconomic History   • Marital status: /Civil Union     Spouse name: None   • Number of children: 2   • Years of education: None   • Highest education level: None   Occupational History   • None   Tobacco Use   • Smoking status: Never   • Smokeless tobacco: Never   Vaping Use   • Vaping Use: Never used   Substance and Sexual Activity   • Alcohol use: Yes     Comment: rare   • Drug use: No   • Sexual activity: Yes     Partners: Male     Birth control/protection: Post-menopausal     Comment: Linda Calero;  x 10 years, together 13   Other Topics Concern   • None   Social History Narrative    Daily caffeine consumption 1 cup/daily        Exercises 3 to 4 times per week        [place] years     Social Determinants of Health     Financial Resource Strain: Not on file   Food Insecurity: Not on file   Transportation Needs: Not on file   Physical Activity: Not on file   Stress: Not on file   Social Connections: Not on file   Intimate Partner Violence: Not on file   Housing Stability: Not on file     Current Outpatient Medications on File Prior to Visit   Medication Sig   • Ascorbic Acid (vitamin C) 1000 MG tablet Take 1,000 mg by mouth daily Will increase dose to 2000 mg day before surgery (Patient not taking: Reported on 3/18/2022)   • cetirizine (ZyrTEC) 10 mg tablet Take 5 mg by mouth as needed Takes 1/2 tablet = 5mg (Patient not taking: Reported on 3/18/2022)   • dextromethorphan-guaifenesin (MUCINEX DM)  MG per 12 hr tablet Take 1 tablet by mouth 2 (two) times a day as needed for cough (Patient not taking: Reported on 9/2/2021)   • fluticasone (FLONASE) 50 mcg/act nasal spray 2 sprays into each nostril daily (Patient not taking: Reported on 3/18/2022)   • Misc Natural Products (Curcumax Pro) TABS Take 2 tablets by mouth 3 (three) times a day Start day before surgery (Patient not taking: Reported on 3/18/2022)   • Neomycin-Polymyxin-Dexameth (Maxitrol) 0.1 % OINT Apply to eye Post op (Patient not taking: Reported on 3/18/2022)   • ondansetron (ZOFRAN) 4 mg tablet Take 1 tablet (4 mg total) by mouth every 8 (eight) hours as needed for nausea or vomiting (Patient not taking: Reported on 9/2/2021)   • valACYclovir (VALTREX) 1,000 mg tablet 1,000 mg Start day before surgery and continue till completed (Patient not taking: Reported on 9/2/2021)   • VITAMIN D PO Take 1,000 mg by mouth (Patient not taking: Reported on 4/19/2023)     Allergies   Allergen Reactions   • Codeine GI Intolerance and Abdominal Pain     Immunization History   Administered Date(s) Administered   • COVID-19 MODERNA VACC 0.5 ML IM 04/09/2021, 05/12/2021       Objective     /76 (BP Location: Left arm, Patient Position: Sitting, Cuff Size: Standard)   Pulse 74   Temp 98.1 °F (36.7 °C)   Ht 5' 3" (1.6 m)   Wt 54 kg (119 lb)   LMP  (LMP Unknown)   BMI 21.08 kg/m²     Physical Exam  Vitals and nursing note reviewed. Constitutional:       General: She is not in acute distress. Appearance: Normal appearance. She is normal weight. She is not ill-appearing, toxic-appearing or diaphoretic. Comments: Pleasant articulate healthy appearing 65-year-old female who is awake alert in no acute distress and oriented x3   HENT:      Head: Normocephalic and atraumatic. Right Ear: Tympanic membrane, ear canal and external ear normal. There is no impacted cerumen. Left Ear: Tympanic membrane, ear canal and external ear normal. There is no impacted cerumen. Nose: Nose normal. No congestion or rhinorrhea. Mouth/Throat:      Mouth: Mucous membranes are moist.      Pharynx: Oropharynx is clear. No oropharyngeal exudate or posterior oropharyngeal erythema. Eyes:      General: No scleral icterus. Right eye: No discharge. Left eye: No discharge. Extraocular Movements: Extraocular movements intact. Conjunctiva/sclera: Conjunctivae normal.      Pupils: Pupils are equal, round, and reactive to light. Neck:      Vascular: No carotid bruit. Cardiovascular:      Rate and Rhythm: Normal rate and regular rhythm. Pulses: Normal pulses. Heart sounds: Normal heart sounds. No murmur heard. No friction rub. No gallop. Pulmonary:      Effort: Pulmonary effort is normal. No respiratory distress. Breath sounds: Normal breath sounds. No stridor. No wheezing, rhonchi or rales. Chest:      Chest wall: No tenderness. Abdominal:      General: Abdomen is flat. Bowel sounds are normal. There is no distension. Palpations: Abdomen is soft. There is no mass. Tenderness: There is no abdominal tenderness. There is no right CVA tenderness, left CVA tenderness, guarding or rebound. Hernia: No hernia is present. Musculoskeletal:         General: No swelling, tenderness, deformity or signs of injury. Normal range of motion. Cervical back: Normal range of motion and neck supple. No rigidity or tenderness. Right lower leg: No edema. Left lower leg: No edema. Lymphadenopathy:      Cervical: No cervical adenopathy. Skin:     General: Skin is warm and dry. Capillary Refill: Capillary refill takes less than 2 seconds. Coloration: Skin is not jaundiced or pale. Findings: No bruising, erythema, lesion or rash. Neurological:      General: No focal deficit present.       Mental Status: She is alert and oriented to person, place, and time. Mental status is at baseline. Cranial Nerves: No cranial nerve deficit. Sensory: No sensory deficit. Motor: No weakness. Coordination: Coordination normal.      Gait: Gait normal.      Deep Tendon Reflexes: Reflexes normal.   Psychiatric:         Mood and Affect: Mood normal.         Behavior: Behavior normal.         Thought Content:  Thought content normal.         Judgment: Judgment normal.   Abel Perry, DO

## 2023-10-25 NOTE — ASSESSMENT & PLAN NOTE
States some of the fatigue may be from inadequate sleep at night. No snoring or witnessed apnea.   Check thyroid function studies

## 2023-10-25 NOTE — ASSESSMENT & PLAN NOTE
Vitamin D deficiency. She did have labs performed and vitamin D level is still slightly low at 29. Patient admits she is not always remembering to take her vitamin D as previously prescribed. Reminded again to take this on a daily basis and check a level again with the patient in 6 months.

## 2023-10-25 NOTE — ASSESSMENT & PLAN NOTE
Did have labs performed prior to the visit but were still waiting Hemoccult testing. Will undergo further work-up and evaluation if positive.

## 2023-10-25 NOTE — ASSESSMENT & PLAN NOTE
Does have a history of hyperlipidemia, family history of elevated cholesterol profile but importantly also family history of very high HDL cholesterol.   Continue to monitor cholesterol readings

## 2023-10-25 NOTE — ASSESSMENT & PLAN NOTE
We continue to monitor the patient's blood sugar readings which have been slightly elevated but no evidence of diabetes. She states he is not always perfect with her diet but we did discuss again the importance of watching her intake of concentrated sweets and simple carbohydrates. Check a fasting blood sugar hemoglobin A1c with her next visit.   She continues to stay physically active

## 2024-02-21 PROBLEM — Z12.11 COLON CANCER SCREENING: Status: RESOLVED | Noted: 2018-07-27 | Resolved: 2024-02-21

## 2024-11-05 ENCOUNTER — OFFICE VISIT (OUTPATIENT)
Age: 64
End: 2024-11-05
Payer: MEDICARE

## 2024-11-05 VITALS
SYSTOLIC BLOOD PRESSURE: 128 MMHG | TEMPERATURE: 98.1 F | RESPIRATION RATE: 16 BRPM | DIASTOLIC BLOOD PRESSURE: 72 MMHG | WEIGHT: 122 LBS | HEIGHT: 63 IN | OXYGEN SATURATION: 97 % | HEART RATE: 84 BPM | BODY MASS INDEX: 21.62 KG/M2

## 2024-11-05 DIAGNOSIS — Z00.00 INITIAL MEDICARE ANNUAL WELLNESS VISIT: Primary | ICD-10-CM

## 2024-11-05 DIAGNOSIS — E78.01 FAMILIAL HYPERCHOLESTEROLEMIA: ICD-10-CM

## 2024-11-05 DIAGNOSIS — M67.441 GANGLION OF FLEXOR TENDON SHEATH OF RIGHT MIDDLE FINGER: ICD-10-CM

## 2024-11-05 DIAGNOSIS — Z12.31 SCREENING MAMMOGRAM FOR BREAST CANCER: ICD-10-CM

## 2024-11-05 DIAGNOSIS — R73.9 HYPERGLYCEMIA: ICD-10-CM

## 2024-11-05 DIAGNOSIS — Z00.00 MEDICARE ANNUAL WELLNESS VISIT, INITIAL: ICD-10-CM

## 2024-11-05 PROCEDURE — 99214 OFFICE O/P EST MOD 30 MIN: CPT | Performed by: INTERNAL MEDICINE

## 2024-11-05 PROCEDURE — G0403 EKG FOR INITIAL PREVENT EXAM: HCPCS | Performed by: INTERNAL MEDICINE

## 2024-11-05 PROCEDURE — G0402 INITIAL PREVENTIVE EXAM: HCPCS | Performed by: INTERNAL MEDICINE

## 2024-11-05 NOTE — ASSESSMENT & PLAN NOTE
History of hyperglycemia not overt diabetes.  Check a fasting blood sugar hemoglobin A1c now and call the patient if there is any abnormalities that we need to address.  Discussed the importance of watching her intake of concentrated sweets and simple carbohydrates.    Orders:    Hemoglobin A1C; Future

## 2024-11-05 NOTE — ASSESSMENT & PLAN NOTE
History of hyperlipidemia.  She relates that she is not always perfect with her diet but really tries to watch her intake along with her  of fats and cholesterol.  Check a lipid profile now and we will call the patient if there is any abnormalities that we need to address.

## 2024-11-05 NOTE — ASSESSMENT & PLAN NOTE
Patient is a 63-year-old female with medical problems as outlined previously who is here today for her first Medicare wellness visit.  She did not have labs performed prior to the visit but will have these done in the near future.  States in general doing well.  Does have a slight nasal congestion and cough but no fever chills or shortness of breath.  Otherwise has some problems with arthritis in the hands and does follow-up with orthopedic surgeon.  Patient is up-to-date with routine mammogram and GYN exam.  Patient did undergo physical exam today in the office with results as noted.  Also performed EKG.  At this point patient will continue present medication surveillance and we told the patient we will call if there is any abnormalities with any of the labs

## 2024-11-05 NOTE — PROGRESS NOTES
Ambulatory Visit  Name: Tiara Viera      : 1960      MRN: [id number]  Encounter Provider: Randall Lozano DO  Encounter Date: 2024   Encounter department: [facility] INTERNAL MEDICINE    Assessment & Plan  Screening mammogram for breast cancer    Orders:    Mammo screening bilateral w 3d and cad; Future    Initial Medicare annual wellness visit    Orders:    POCT ECG    Comprehensive metabolic panel; Future    CBC and differential; Future    Lipid panel; Future    Urinalysis with microscopic; Future    Hemoglobin A1C; Future    Medicare annual wellness visit, initial  Patient is a 63-year-old female with medical problems as outlined previously who is here today for her first Medicare wellness visit.  She did not have labs performed prior to the visit but will have these done in the near future.  States in general doing well.  Does have a slight nasal congestion and cough but no fever chills or shortness of breath.  Otherwise has some problems with arthritis in the hands and does follow-up with orthopedic surgeon.  Patient is up-to-date with routine mammogram and GYN exam.  Patient did undergo physical exam today in the office with results as noted.  Also performed EKG.  At this point patient will continue present medication surveillance and we told the patient we will call if there is any abnormalities with any of the labs         Familial hypercholesterolemia  History of hyperlipidemia.  She relates that she is not always perfect with her diet but really tries to watch her intake along with her  of fats and cholesterol.  Check a lipid profile now and we will call the patient if there is any abnormalities that we need to address.         Hyperglycemia  History of hyperglycemia not overt diabetes.  Check a fasting blood sugar hemoglobin A1c now and call the patient if there is any abnormalities that we need to address.  Discussed the importance of watching her intake of concentrated  "sweets and simple carbohydrates.    Orders:    Hemoglobin A1C; Future    Ganglion of flexor tendon sheath of right middle finger  Painful to the touch.  Will be undergoing surgical procedure in the near future.              History of Present Illness     Patient is a 63-year-old female with a history of multiple medical problems outlined previously who is here today for her first Medicare wellness visit.  Patient relates that she is still staying physically active.  Does have a complaint of upper respiratory tract symptoms with nasal congestion slight cough.  No fever chills or shortness of breath.  States her mucus is thick but not purulent      Review of Systems   Constitutional: Negative.    HENT:  Positive for congestion and postnasal drip. Negative for dental problem, drooling, ear discharge, ear pain, facial swelling, hearing loss, mouth sores, nosebleeds, rhinorrhea, sinus pressure, sinus pain, sneezing, sore throat, tinnitus, trouble swallowing and voice change.    Eyes: Negative.    Respiratory:  Positive for cough. Negative for apnea, choking, chest tightness, shortness of breath, wheezing and stridor.    Cardiovascular: Negative.    Gastrointestinal: Negative.    Endocrine: Negative.    Genitourinary: Negative.    Musculoskeletal: Negative.    Skin: Negative.    Allergic/Immunologic: Negative.    Neurological: Negative.    Hematological: Negative.    Psychiatric/Behavioral: Negative.       Past Medical History:   Diagnosis Date    Anesthesia complication     \"Slow to Wake up\" post op    Nonadherence to medical treatment     No show for 2 pelvic floor PT appt     Past Surgical History:   Procedure Laterality Date    ECTROPIAN REPAIR Bilateral 2/23/2021    Procedure: ECTROPION REPAIR LLOU, LATERAL CANTHOPLASTY LLOU, ADJ TISSUE REARRANGEMENT CHEEK OU, ADJ TISSUE REARRANGEMENT LID OU W/LASER;  Surgeon: Crys Hinds MD;  Location:  MAIN OR;  Service: Ophthalmology    FACELIFT      Mini-Done under local in " Office    MASS EXCISION Right 7/27/2021    Procedure: EXCISION BIOPSY LONG AND RING FINGER MASS;  Surgeon: José Jacobs MD;  [facility] MAIN [facility];  Service: Orthopedics    [place] COLONOSCOPY FLX DX W/COLLJ SPEC WHEN PFRMD N/A 8/23/2018    Procedure: COLONOSCOPY;  Surgeon: Jarvis Knutson MD;  Location[facility];  Service: Gastroenterology    TUBAL LIGATION      WISDOM TOOTH EXTRACTION       Family History   Problem Relation Age of Onset    No Known Problems Mother     Heart attack Father 65    No Known Problems Sister     No Known Problems Sister     No Known Problems Sister     No Known Problems Brother     No Known Problems Daughter     No Known Problems Son     Arthritis Family     Breast cancer Neg Hx     Ovarian cancer Neg Hx     Colon cancer Neg Hx      Social History     Tobacco Use    Smoking status: Never    Smokeless tobacco: Never   Vaping Use    Vaping status: Never Used   Substance and Sexual Activity    Alcohol use: Yes     Comment: rare    Drug use: No    Sexual activity: Yes     Partners: Male     Birth control/protection: Post-menopausal     Comment: Martínez;  x 10 years, together 15     Current Outpatient Medications on File Prior to Visit   Medication Sig    Ascorbic Acid (vitamin C) 1000 MG tablet Take 1,000 mg by mouth daily Will increase dose to 2000 mg day before surgery (Patient not taking: Reported on 3/18/2022)    cetirizine (ZyrTEC) 10 mg tablet Take 5 mg by mouth as needed Takes 1/2 tablet = 5mg (Patient not taking: Reported on 3/18/2022)    dextromethorphan-guaifenesin (MUCINEX DM)  MG per 12 hr tablet Take 1 tablet by mouth 2 (two) times a day as needed for cough (Patient not taking: Reported on 9/2/2021)    fluticasone (FLONASE) 50 mcg/act nasal spray 2 sprays into each nostril daily (Patient not taking: Reported on 3/18/2022)    Misc Natural Products (Curcumax Pro) TABS Take 2 tablets by mouth 3 (three) times a day Start day before surgery (Patient not taking:  "Reported on 3/18/2022)    Neomycin-Polymyxin-Dexameth (Maxitrol) 0.1 % OINT Apply to eye Post op (Patient not taking: Reported on 3/18/2022)    ondansetron (ZOFRAN) 4 mg tablet Take 1 tablet (4 mg total) by mouth every 8 (eight) hours as needed for nausea or vomiting (Patient not taking: Reported on 9/2/2021)    valACYclovir (VALTREX) 1,000 mg tablet 1,000 mg Start day before surgery and continue till completed (Patient not taking: Reported on 9/2/2021)    VITAMIN D PO Take 1,000 mg by mouth (Patient not taking: Reported on 4/19/2023)     Allergies   Allergen Reactions    Codeine GI Intolerance and Abdominal Pain     Immunization History   Administered Date(s) Administered    COVID-19 MODERNA VACC 0.5 ML IM 04/09/2021, 05/12/2021, 05/19/2022     Objective     /72   Pulse 84   Temp 98.1 °F (36.7 °C)   Resp 16   Ht 5' 3\" (1.6 m)   Wt 55.3 kg (122 lb)   LMP  (LMP Unknown)   SpO2 97%   BMI 21.61 kg/m²     Physical Exam  Vitals and nursing note reviewed.   Constitutional:       General: She is not in acute distress.     Appearance: Normal appearance. She is normal weight. She is not ill-appearing, toxic-appearing or diaphoretic.      Comments: Pleasant articulate healthy appearing 63-year-old female who is awake alert in no acute distress and oriented x 3   HENT:      Head: Normocephalic and atraumatic.      Right Ear: Tympanic membrane, ear canal and external ear normal. There is no impacted cerumen.      Left Ear: Tympanic membrane, ear canal and external ear normal. There is no impacted cerumen.      Nose: Congestion and rhinorrhea present.      Comments: Some very slight erythema to nasal mucosa with mildly enlarged turbinates and clear nasal discharge     Mouth/Throat:      Mouth: Mucous membranes are moist.      Pharynx: Oropharyngeal exudate and posterior oropharyngeal erythema present.      Comments: Slight erythema to posterior airway with a clear to whitish postnasal drip  Eyes:      General: No " scleral icterus.        Right eye: No discharge.         Left eye: No discharge.      Extraocular Movements: Extraocular movements intact.      Conjunctiva/sclera: Conjunctivae normal.      Pupils: Pupils are equal, round, and reactive to light.   Neck:      Vascular: No carotid bruit.   Cardiovascular:      Rate and Rhythm: Normal rate and regular rhythm.      Pulses: Normal pulses.      Heart sounds: Normal heart sounds. No murmur heard.     No friction rub. No gallop.   Pulmonary:      Effort: Pulmonary effort is normal. No respiratory distress.      Breath sounds: Normal breath sounds. No stridor. No wheezing, rhonchi or rales.   Chest:      Chest wall: No tenderness.   Abdominal:      General: Abdomen is flat. Bowel sounds are normal. There is no distension.      Palpations: Abdomen is soft. There is no mass.      Tenderness: There is no abdominal tenderness. There is no right CVA tenderness, left CVA tenderness, guarding or rebound.      Hernia: No hernia is present.   Musculoskeletal:         General: Tenderness and deformity present. No swelling or signs of injury. Normal range of motion.      Cervical back: Normal range of motion and neck supple. No rigidity or tenderness.      Right lower leg: No edema.      Left lower leg: No edema.      Comments: Slight tenderness to her right calf.  Reports recently pulled a muscle there but is feeling better.  Ganglion is noted with middle finger right hand   Lymphadenopathy:      Cervical: No cervical adenopathy.   Skin:     General: Skin is warm and dry.      Capillary Refill: Capillary refill takes less than 2 seconds.      Coloration: Skin is not jaundiced or pale.      Findings: No bruising, erythema, lesion or rash.   Neurological:      General: No focal deficit present.      Mental Status: She is alert and oriented to person, place, and time. Mental status is at baseline.      Cranial Nerves: No cranial nerve deficit.      Sensory: No sensory deficit.       Motor: No weakness.      Coordination: Coordination normal.      Gait: Gait normal.      Deep Tendon Reflexes: Reflexes normal.   Psychiatric:         Mood and Affect: Mood normal.         Behavior: Behavior normal.         Thought Content: Thought content normal.         Judgment: Judgment normal.

## 2024-11-05 NOTE — PROGRESS NOTES
Ambulatory Visit  Name: Tiara Viera      : 1960      MRN: [id number]  Encounter Provider: Randall Lozano DO  Encounter Date: 2024   Encounter department: [facility] INTERNAL MEDICINE    Assessment & Plan       Preventive health issues were discussed with patient, and age appropriate screening tests were ordered as noted in patient's After Visit Summary. Personalized health advice and appropriate referrals for health education or preventive services given if needed, as noted in patient's After Visit Summary.    History of Present Illness     HPI   Patient Care Team:  Randall Lozano DO as PCP - General  MD Jarvis Guerrero MD as Endoscopist    Review of Systems  Medical History Reviewed by provider this encounter:       Annual Wellness Visit Questionnaire   Tiara is here for her Subsequent Wellness visit.     Health Risk Assessment:   Patient rates overall health as very good. Patient feels that their physical health rating is slightly better. Patient is satisfied with their life. Eyesight was rated as same. Hearing was rated as same. Patient feels that their emotional and mental health rating is same. Patients states they are sometimes angry. Patient states they are sometimes unusually tired/fatigued. Pain experienced in the last 7 days has been none. Patient states that she has experienced no weight loss or gain in last 6 months.     Depression Screening:   PHQ-2 Score: 0  PHQ-9 Score: 0      Fall Risk Screening:   In the past year, patient has experienced: no history of falling in past year      Urinary Incontinence Screening:   Patient has not leaked urine accidently in the last six months.     Home Safety:  Patient does not have trouble with stairs inside or outside of their home. Patient has working smoke alarms and has working carbon monoxide detector. Home safety hazards include: none.     Nutrition:   Current diet is Regular.     Medications:   Patient is not  "currently taking any over-the-counter supplements. Patient is able to manage medications.     Activities of Daily Living (ADLs)/Instrumental Activities of Daily Living (IADLs):   Walk and transfer into and out of bed and chair?: Yes  Dress and groom yourself?: Yes    Bathe or shower yourself?: Yes    Feed yourself? Yes  Do your laundry/housekeeping?: Yes  Manage your money, pay your bills and track your expenses?: Yes  Make your own meals?: Yes    Do your own shopping?: Yes    Previous Hospitalizations:   Any hospitalizations or ED visits within the last 12 months?: No      Advance Care Planning:   Living will: No    Durable POA for healthcare: No    Advanced directive: No      PREVENTIVE SCREENINGS      Cardiovascular Screening:    General: Screening Not Indicated and History Lipid Disorder      Colorectal Cancer Screening:     General: Screening Current      Lung Cancer Screening:     General: Screening Not Indicated    Screening, Brief Intervention, and Referral to Treatment (SBIRT)    Screening  Typical number of drinks in a day: 0  Typical number of drinks in a week: 4  Interpretation: Low risk drinking behavior.    Single Item Drug Screening:  How often have you used an illegal drug (including marijuana) or a prescription medication for non-medical reasons in the past year? never    Single Item Drug Screen Score: 0  Interpretation: Negative screen for possible drug use disorder       Vision Screening    Right eye Left eye Both eyes   Without correction      With correction 20/20 20/25 20/20       Objective     Temp 98.1 °F (36.7 °C)   Resp 16   Ht 5' 3\" (1.6 m)   Wt 55.3 kg (122 lb)   LMP  (LMP Unknown)   BMI 21.61 kg/m²     Physical Exam    "

## 2024-12-05 PROBLEM — Z00.00 MEDICARE ANNUAL WELLNESS VISIT, INITIAL: Status: RESOLVED | Noted: 2019-05-07 | Resolved: 2024-12-05

## 2025-03-24 NOTE — TELEPHONE ENCOUNTER
Mekhi Hall,  Patient is asking if you could give her a call. Patient can be reached at: [phone]. Thank you. <<--- Click to launch

## 2025-04-09 ENCOUNTER — TELEPHONE (OUTPATIENT)
Age: 65
End: 2025-04-09

## 2025-04-09 DIAGNOSIS — N30.00 ACUTE CYSTITIS WITHOUT HEMATURIA: Primary | ICD-10-CM

## 2025-04-09 DIAGNOSIS — R39.9 UTI SYMPTOMS: Primary | ICD-10-CM

## 2025-04-09 RX ORDER — CEPHALEXIN 500 MG/1
500 CAPSULE ORAL 2 TIMES DAILY
Qty: 14 CAPSULE | Refills: 0 | Status: SHIPPED | OUTPATIENT
Start: 2025-04-09 | End: 2025-04-16

## 2025-04-09 NOTE — ASSESSMENT & PLAN NOTE
Is complaining of repeated urinary tract infection.  Is asking for antibiotic but has an appointment to be seen tomorrow.  Order was placed for urinalysis and as soon as the urine is dropped off she will start antibiotics prior to the visit tomorrow.

## 2025-04-09 NOTE — TELEPHONE ENCOUNTER
Pt called, having UTI, wanted to let Dr Lozano know. Needs antibiotics. Scheduled pt with PCP for tomorrow at 8:40 am. But pt still wanted to ask if Dr Lozano can send her antibiotics for today. Please advise and contact pt if appointment is not needed

## 2025-04-10 ENCOUNTER — OFFICE VISIT (OUTPATIENT)
Age: 65
End: 2025-04-10
Payer: MEDICARE

## 2025-04-10 VITALS
SYSTOLIC BLOOD PRESSURE: 124 MMHG | WEIGHT: 120.2 LBS | OXYGEN SATURATION: 97 % | HEART RATE: 65 BPM | DIASTOLIC BLOOD PRESSURE: 72 MMHG | HEIGHT: 63 IN | BODY MASS INDEX: 21.3 KG/M2 | TEMPERATURE: 97.9 F

## 2025-04-10 DIAGNOSIS — M81.0 POSTMENOPAUSAL BONE LOSS: Primary | ICD-10-CM

## 2025-04-10 DIAGNOSIS — N95.2 VAGINAL ATROPHY: ICD-10-CM

## 2025-04-10 DIAGNOSIS — N30.00 ACUTE CYSTITIS WITHOUT HEMATURIA: ICD-10-CM

## 2025-04-10 LAB
BILIRUB UR QL STRIP: NEGATIVE
CLARITY UR: CLEAR
COLOR UR: NORMAL
GLUCOSE UR STRIP-MCNC: NEGATIVE MG/DL
HGB UR QL STRIP.AUTO: NEGATIVE
KETONES UR STRIP-MCNC: NEGATIVE MG/DL
LEUKOCYTE ESTERASE UR QL STRIP: NEGATIVE
NITRITE UR QL STRIP: NEGATIVE
PH UR STRIP.AUTO: 7 [PH]
PROT UR STRIP-MCNC: NEGATIVE MG/DL
SL AMB  POCT GLUCOSE, UA: ABNORMAL
SL AMB LEUKOCYTE ESTERASE,UA: ABNORMAL
SL AMB POCT BILIRUBIN,UA: ABNORMAL
SL AMB POCT BLOOD,UA: ABNORMAL
SL AMB POCT CLARITY,UA: CLEAR
SL AMB POCT COLOR,UA: CLEAR
SL AMB POCT KETONES,UA: ABNORMAL
SL AMB POCT NITRITE,UA: ABNORMAL
SL AMB POCT PH,UA: 7
SL AMB POCT SPECIFIC GRAVITY,UA: 1.01
SL AMB POCT URINE PROTEIN: ABNORMAL
SL AMB POCT UROBILINOGEN: ABNORMAL
SP GR UR STRIP.AUTO: 1.01 (ref 1–1.03)
UROBILINOGEN UR STRIP-ACNC: <2 MG/DL

## 2025-04-10 PROCEDURE — 81002 URINALYSIS NONAUTO W/O SCOPE: CPT | Performed by: INTERNAL MEDICINE

## 2025-04-10 PROCEDURE — 99213 OFFICE O/P EST LOW 20 MIN: CPT | Performed by: INTERNAL MEDICINE

## 2025-04-10 PROCEDURE — 81003 URINALYSIS AUTO W/O SCOPE: CPT | Performed by: INTERNAL MEDICINE

## 2025-04-10 PROCEDURE — G2211 COMPLEX E/M VISIT ADD ON: HCPCS | Performed by: INTERNAL MEDICINE

## 2025-04-10 NOTE — ASSESSMENT & PLAN NOTE
Patient does have a deficiency being postmenopausal with his noted vaginal atrophy.  Along with this she also has a low vitamin D level.  As part of routine surveillance we made a decision today to send the patient for a DEXA scan.  Patient understands and agrees new order has been written.  We will call the patient with the results and decisions as to if any treatment is needed

## 2025-04-10 NOTE — PROGRESS NOTES
Name: Tiara Viera      : 1960      MRN: [id number]  Encounter Provider: Randall Lozano DO  Encounter Date: 4/10/2025   Encounter department: [facility] INTERNAL MEDICINE    Assessment & Plan  Acute cystitis without hematuria  Patient states symptoms started on  with some slight burning and some frequency with urination and symptoms seem to progress through the week.  Denies any back pain no fever chills, no abdominal pain or cramping and no back pain.  Denies any foul smell to her urine and she was able to give us a urine sample today in the office which is positive for a large amount of leukocytes.  She did undergo physical exam which was normal.  We sent her urine off for culture.  Patient has been placed on Keflex 500 mg p.o. twice daily for 5 days pending the results of culture.  Patient knows that we will call if we would need to change antibiotic treatment.  Patient knows to call if no resolution of symptoms.    Orders:  •  POCT urine dip  •  DXA bone density spine hip and pelvis; Future  •  UA w Reflex to Microscopic w Reflex to Culture; Future    Postmenopausal bone loss    Orders:  •  DXA bone density spine hip and pelvis; Future    Vaginal atrophy  Patient does have a deficiency being postmenopausal with his noted vaginal atrophy.  Along with this she also has a low vitamin D level.  As part of routine surveillance we made a decision today to send the patient for a DEXA scan.  Patient understands and agrees new order has been written.  We will call the patient with the results and decisions as to if any treatment is needed              History of Present Illness     Patient is a 64-year-old female who is here today for acute evaluation.  States that she is having classic symptoms of urinary tract infection starting on  with some burning, frequency with urination.  Relates her symptomatology has gotten worse over the week.  Positive urine dipstick today in the office  "indicating possible urinary tract infection  Review of Systems   Constitutional: Negative.    HENT: Negative.     Eyes: Negative.    Respiratory: Negative.     Cardiovascular: Negative.    Gastrointestinal: Negative.    Endocrine: Negative.    Genitourinary:  Positive for dysuria and frequency. Negative for decreased urine volume, difficulty urinating, dyspareunia, enuresis, flank pain, genital sores, hematuria, menstrual problem, pelvic pain, urgency, vaginal bleeding, vaginal discharge and vaginal pain.   Musculoskeletal: Negative.    Skin: Negative.    Allergic/Immunologic: Negative.    Neurological: Negative.    Hematological: Negative.    Psychiatric/Behavioral: Negative.     Past Medical History:   Diagnosis Date   • Anesthesia complication     \"Slow to Wake up\" post op   • Nonadherence to medical treatment     No show for 2 pelvic floor PT appt     Past Surgical History:   Procedure Laterality Date   • ECTROPIAN REPAIR Bilateral 2/23/2021    Procedure: ECTROPION REPAIR LLOU, LATERAL CANTHOPLASTY LLOU, ADJ TISSUE REARRANGEMENT CHEEK OU, ADJ TISSUE REARRANGEMENT LID OU W/LASER;  Surgeon: Crys Hinds MD;  Location:  MAIN OR;  Service: Ophthalmology   • FACELIFT      Mini-Done under local in Office   • MASS EXCISION Right 7/27/2021    Procedure: EXCISION BIOPSY LONG AND RING FINGER MASS;  Surgeon: José Jacobs MD;  [facility];  Service: Orthopedics   • [place] COLONOSCOPY FLX DX W/COLLJ SPEC WHEN PFRMD N/A 8/23/2018    Procedure: COLONOSCOPY;  Surgeon: Jarvis Knutson MD;  [facility]: [facility];  Service: Gastroenterology   • TUBAL LIGATION     • WISDOM TOOTH EXTRACTION       Family History   Problem Relation Age of Onset   • No Known Problems Mother    • Heart attack Father 65   • No Known Problems Sister    • No Known Problems Sister    • No Known Problems Sister    • No Known Problems Brother    • No Known Problems Daughter    • No Known Problems Son    • Arthritis Family    • Breast cancer Neg " Hx    • Ovarian cancer Neg Hx    • Colon cancer Neg Hx      Social History     Tobacco Use   • Smoking status: Never   • Smokeless tobacco: Never   Vaping Use   • Vaping status: Never Used   Substance and Sexual Activity   • Alcohol use: Yes     Comment: rare   • Drug use: No   • Sexual activity: Yes     Partners: Male     Birth control/protection: Post-menopausal     Comment: Martínez;  x 10 years, together 15     Current Outpatient Medications on File Prior to Visit   Medication Sig   • cephalexin (KEFLEX) 500 mg capsule Take 1 capsule (500 mg total) by mouth 2 (two) times a day for 7 days   • Ascorbic Acid (vitamin C) 1000 MG tablet Take 1,000 mg by mouth daily Will increase dose to 2000 mg day before surgery (Patient not taking: Reported on 3/18/2022)   • cetirizine (ZyrTEC) 10 mg tablet Take 5 mg by mouth as needed Takes 1/2 tablet = 5mg (Patient not taking: Reported on 3/18/2022)   • dextromethorphan-guaifenesin (MUCINEX DM)  MG per 12 hr tablet Take 1 tablet by mouth 2 (two) times a day as needed for cough (Patient not taking: Reported on 9/2/2021)   • fluticasone (FLONASE) 50 mcg/act nasal spray 2 sprays into each nostril daily (Patient not taking: Reported on 3/18/2022)   • Misc Natural Products (Curcumax Pro) TABS Take 2 tablets by mouth 3 (three) times a day Start day before surgery (Patient not taking: Reported on 3/18/2022)   • Neomycin-Polymyxin-Dexameth (Maxitrol) 0.1 % OINT Apply to eye Post op (Patient not taking: Reported on 3/18/2022)   • ondansetron (ZOFRAN) 4 mg tablet Take 1 tablet (4 mg total) by mouth every 8 (eight) hours as needed for nausea or vomiting (Patient not taking: Reported on 9/2/2021)   • valACYclovir (VALTREX) 1,000 mg tablet 1,000 mg Start day before surgery and continue till completed (Patient not taking: Reported on 9/2/2021)   • VITAMIN D PO Take 1,000 mg by mouth (Patient not taking: Reported on 4/19/2023)     Allergies   Allergen Reactions   • Codeine GI  "Intolerance and Abdominal Pain     Immunization History   Administered Date(s) Administered   • COVID-19 MODERNA VACC 0.5 ML IM 04/09/2021, 05/12/2021, 05/19/2022     Objective   /72 (BP Location: Left arm, Patient Position: Sitting)   Pulse 65   Temp 97.9 °F (36.6 °C) (Tympanic)   Ht 5' 3\" (1.6 m)   Wt 54.5 kg (120 lb 3.2 oz)   LMP  (LMP Unknown)   SpO2 97%   BMI 21.29 kg/m²     Physical Exam  Vitals and nursing note reviewed.   Constitutional:       General: She is not in acute distress.     Appearance: Normal appearance. She is normal weight. She is not ill-appearing, toxic-appearing or diaphoretic.      Comments: Pleasant, healthy appearing 64-year-old female who is awake alert in no acute distress oriented x 3   HENT:      Head: Normocephalic and atraumatic.      Right Ear: Tympanic membrane, ear canal and external ear normal. There is no impacted cerumen.      Left Ear: Tympanic membrane, ear canal and external ear normal. There is no impacted cerumen.      Nose: Nose normal. No congestion or rhinorrhea.      Mouth/Throat:      Mouth: Mucous membranes are moist.      Pharynx: Oropharynx is clear. No oropharyngeal exudate or posterior oropharyngeal erythema.   Eyes:      General: No scleral icterus.        Right eye: No discharge.         Left eye: No discharge.      Extraocular Movements: Extraocular movements intact.      Conjunctiva/sclera: Conjunctivae normal.      Pupils: Pupils are equal, round, and reactive to light.   Cardiovascular:      Rate and Rhythm: Normal rate and regular rhythm.   Pulmonary:      Effort: Pulmonary effort is normal.      Breath sounds: Normal breath sounds.   Abdominal:      General: Abdomen is flat. Bowel sounds are normal. There is no distension.      Palpations: Abdomen is soft. There is no mass.      Tenderness: There is no abdominal tenderness. There is no right CVA tenderness, left CVA tenderness, guarding or rebound.      Hernia: No hernia is present. "   Musculoskeletal:         General: Normal range of motion.      Cervical back: Normal range of motion and neck supple.   Skin:     General: Skin is warm and dry.   Neurological:      General: No focal deficit present.      Mental Status: She is alert and oriented to person, place, and time. Mental status is at baseline.   Psychiatric:         Mood and Affect: Mood normal.         Behavior: Behavior normal.         Thought Content: Thought content normal.         Judgment: Judgment normal.

## 2025-04-10 NOTE — ASSESSMENT & PLAN NOTE
Patient states symptoms started on Sunday with some slight burning and some frequency with urination and symptoms seem to progress through the week.  Denies any back pain no fever chills, no abdominal pain or cramping and no back pain.  Denies any foul smell to her urine and she was able to give us a urine sample today in the office which is positive for a large amount of leukocytes.  She did undergo physical exam which was normal.  We sent her urine off for culture.  Patient has been placed on Keflex 500 mg p.o. twice daily for 5 days pending the results of culture.  Patient knows that we will call if we would need to change antibiotic treatment.  Patient knows to call if no resolution of symptoms.    Orders:    POCT urine dip    DXA bone density spine hip and pelvis; Future    UA w Reflex to Microscopic w Reflex to Culture; Future

## 2025-04-22 ENCOUNTER — NEW PATIENT (OUTPATIENT)
Dept: URBAN - METROPOLITAN AREA CLINIC 6 | Facility: CLINIC | Age: 65
End: 2025-04-22

## 2025-04-22 DIAGNOSIS — H04.123: ICD-10-CM

## 2025-04-22 PROCEDURE — 92004 COMPRE OPH EXAM NEW PT 1/>: CPT

## 2025-04-22 ASSESSMENT — TONOMETRY
OS_IOP_MMHG: 14
OD_IOP_MMHG: 13

## 2025-04-22 ASSESSMENT — VISUAL ACUITY
OD_CC: 20/20
OS_CC: 20/30
OU_CC: 20/20

## 2025-05-01 ENCOUNTER — FOLLOW UP (OUTPATIENT)
Dept: URBAN - METROPOLITAN AREA CLINIC 6 | Facility: CLINIC | Age: 65
End: 2025-05-01

## 2025-05-01 DIAGNOSIS — H02.054: ICD-10-CM

## 2025-05-01 DIAGNOSIS — H04.123: ICD-10-CM

## 2025-05-01 PROCEDURE — 92012 INTRM OPH EXAM EST PATIENT: CPT | Mod: 25

## 2025-05-01 PROCEDURE — 67820 REVISE EYELASHES: CPT | Mod: E1

## 2025-05-01 ASSESSMENT — VISUAL ACUITY
OS_CC: 20/20-1
OD_CC: 20/20-1

## 2025-05-01 ASSESSMENT — TONOMETRY
OS_IOP_MMHG: 13
OD_IOP_MMHG: 12

## 2025-05-07 ENCOUNTER — RX CHECK (OUTPATIENT)
Dept: URBAN - METROPOLITAN AREA CLINIC 6 | Facility: CLINIC | Age: 65
End: 2025-05-07

## 2025-05-07 DIAGNOSIS — Z46.0: ICD-10-CM

## 2025-05-07 DIAGNOSIS — H52.03: ICD-10-CM

## 2025-05-07 PROCEDURE — 92015 DETERMINE REFRACTIVE STATE: CPT

## 2025-05-07 ASSESSMENT — VISUAL ACUITY
OU_CC: 20/20
OU_CC: 20/20
OS_CC: 20/25+2
OD_CC: 20/20-1
OU_CC: J1+
OD_CC: 20/20
OS_CC: 20/25-2

## 2025-05-07 ASSESSMENT — KERATOMETRY
OS_AXISANGLE2_DEGREES: 93
OS_AXISANGLE_DEGREES: 3
OS_K1POWER_DIOPTERS: 46.00
OD_AXISANGLE2_DEGREES: 69
OS_K2POWER_DIOPTERS: 46.50
OD_K1POWER_DIOPTERS: 45.25
OD_K2POWER_DIOPTERS: 46.25
OD_AXISANGLE_DEGREES: 159

## 2025-05-07 ASSESSMENT — TONOMETRY
OS_IOP_MMHG: 10
OD_IOP_MMHG: 15

## 2025-05-09 PROBLEM — N30.00 ACUTE CYSTITIS WITHOUT HEMATURIA: Status: RESOLVED | Noted: 2025-04-09 | Resolved: 2025-05-09

## 2025-05-12 ENCOUNTER — HOSPITAL ENCOUNTER (OUTPATIENT)
Dept: RADIOLOGY | Age: 65
Discharge: HOME | End: 2025-05-12
Payer: MEDICARE

## 2025-05-12 VITALS — WEIGHT: 118 LBS | HEIGHT: 65 IN | BODY MASS INDEX: 19.66 KG/M2

## 2025-05-12 DIAGNOSIS — M81.0 POSTMENOPAUSAL BONE LOSS: ICD-10-CM

## 2025-05-12 DIAGNOSIS — N30.00 ACUTE CYSTITIS WITHOUT HEMATURIA: ICD-10-CM

## 2025-05-12 PROCEDURE — 77080 DXA BONE DENSITY AXIAL: CPT

## 2025-05-20 ENCOUNTER — RESULTS FOLLOW-UP (OUTPATIENT)
Age: 65
End: 2025-05-20

## 2025-05-20 NOTE — TELEPHONE ENCOUNTER
Agree with plan as above thank you
BRYCEI - patient feels good after colonoscopy this am   She was calling with headache from sinus cold that she has  I advised her to drink plenty of fluids and Tylenol for headache  She reports no other symptoms at this time 
SRI PATIENT      She just returned home from [place] this morning and still has a headache  She wants to know if she can take advil or tylenol 
18.3

## 2025-05-22 ENCOUNTER — RA CDI HCC (OUTPATIENT)
Dept: OTHER | Facility: HOSPITAL | Age: 65
End: 2025-05-22

## 2025-05-27 ENCOUNTER — APPOINTMENT (OUTPATIENT)
Dept: LAB | Facility: CLINIC | Age: 65
End: 2025-05-27
Payer: MEDICARE

## 2025-05-27 DIAGNOSIS — Z00.00 INITIAL MEDICARE ANNUAL WELLNESS VISIT: ICD-10-CM

## 2025-05-27 DIAGNOSIS — R73.9 HYPERGLYCEMIA: ICD-10-CM

## 2025-05-27 DIAGNOSIS — R39.9 UTI SYMPTOMS: ICD-10-CM

## 2025-05-27 LAB
ALBUMIN SERPL BCG-MCNC: 4.3 G/DL (ref 3.5–5)
ALP SERPL-CCNC: 59 U/L (ref 34–104)
ALT SERPL W P-5'-P-CCNC: 23 U/L (ref 7–52)
AMORPH URATE CRY URNS QL MICRO: ABNORMAL
AMORPH URATE CRY URNS QL MICRO: ABNORMAL
ANION GAP SERPL CALCULATED.3IONS-SCNC: 5 MMOL/L (ref 4–13)
AST SERPL W P-5'-P-CCNC: 24 U/L (ref 13–39)
BACTERIA UR QL AUTO: ABNORMAL /HPF
BACTERIA UR QL AUTO: ABNORMAL /HPF
BASOPHILS # BLD AUTO: 0.01 THOUSANDS/ÂΜL (ref 0–0.1)
BASOPHILS NFR BLD AUTO: 0 % (ref 0–1)
BILIRUB SERPL-MCNC: 0.62 MG/DL (ref 0.2–1)
BILIRUB UR QL STRIP: NEGATIVE
BILIRUB UR QL STRIP: NEGATIVE
BUN SERPL-MCNC: 18 MG/DL (ref 5–25)
CALCIUM SERPL-MCNC: 9.2 MG/DL (ref 8.4–10.2)
CHLORIDE SERPL-SCNC: 106 MMOL/L (ref 96–108)
CHOLEST SERPL-MCNC: 205 MG/DL (ref ?–200)
CLARITY UR: ABNORMAL
CLARITY UR: ABNORMAL
CO2 SERPL-SCNC: 31 MMOL/L (ref 21–32)
COLOR UR: ABNORMAL
COLOR UR: ABNORMAL
CREAT SERPL-MCNC: 0.73 MG/DL (ref 0.6–1.3)
EOSINOPHIL # BLD AUTO: 0.1 THOUSAND/ÂΜL (ref 0–0.61)
EOSINOPHIL NFR BLD AUTO: 3 % (ref 0–6)
ERYTHROCYTE [DISTWIDTH] IN BLOOD BY AUTOMATED COUNT: 12.4 % (ref 11.6–15.1)
EST. AVERAGE GLUCOSE BLD GHB EST-MCNC: 111 MG/DL
GFR SERPL CREATININE-BSD FRML MDRD: 86 ML/MIN/1.73SQ M
GLUCOSE P FAST SERPL-MCNC: 100 MG/DL (ref 65–99)
GLUCOSE UR STRIP-MCNC: NEGATIVE MG/DL
GLUCOSE UR STRIP-MCNC: NEGATIVE MG/DL
HBA1C MFR BLD: 5.5 %
HCT VFR BLD AUTO: 45.3 % (ref 34.8–46.1)
HDLC SERPL-MCNC: 92 MG/DL
HGB BLD-MCNC: 14.7 G/DL (ref 11.5–15.4)
HGB UR QL STRIP.AUTO: NEGATIVE
HGB UR QL STRIP.AUTO: NEGATIVE
IMM GRANULOCYTES # BLD AUTO: 0 THOUSAND/UL (ref 0–0.2)
IMM GRANULOCYTES NFR BLD AUTO: 0 % (ref 0–2)
KETONES UR STRIP-MCNC: NEGATIVE MG/DL
KETONES UR STRIP-MCNC: NEGATIVE MG/DL
LDLC SERPL CALC-MCNC: 104 MG/DL (ref 0–100)
LEUKOCYTE ESTERASE UR QL STRIP: NEGATIVE
LEUKOCYTE ESTERASE UR QL STRIP: NEGATIVE
LYMPHOCYTES # BLD AUTO: 0.62 THOUSANDS/ÂΜL (ref 0.6–4.47)
LYMPHOCYTES NFR BLD AUTO: 16 % (ref 14–44)
MCH RBC QN AUTO: 30.3 PG (ref 26.8–34.3)
MCHC RBC AUTO-ENTMCNC: 32.5 G/DL (ref 31.4–37.4)
MCV RBC AUTO: 93 FL (ref 82–98)
MONOCYTES # BLD AUTO: 0.27 THOUSAND/ÂΜL (ref 0.17–1.22)
MONOCYTES NFR BLD AUTO: 7 % (ref 4–12)
NEUTROPHILS # BLD AUTO: 2.97 THOUSANDS/ÂΜL (ref 1.85–7.62)
NEUTS SEG NFR BLD AUTO: 74 % (ref 43–75)
NITRITE UR QL STRIP: NEGATIVE
NITRITE UR QL STRIP: NEGATIVE
NON-SQ EPI CELLS URNS QL MICRO: ABNORMAL /HPF
NON-SQ EPI CELLS URNS QL MICRO: ABNORMAL /HPF
NONHDLC SERPL-MCNC: 113 MG/DL
NRBC BLD AUTO-RTO: 0 /100 WBCS
PH UR STRIP.AUTO: 6 [PH]
PH UR STRIP.AUTO: 6 [PH]
PLATELET # BLD AUTO: 190 THOUSANDS/UL (ref 149–390)
PMV BLD AUTO: 9.7 FL (ref 8.9–12.7)
POTASSIUM SERPL-SCNC: 4.2 MMOL/L (ref 3.5–5.3)
PROT SERPL-MCNC: 6.3 G/DL (ref 6.4–8.4)
PROT UR STRIP-MCNC: ABNORMAL MG/DL
PROT UR STRIP-MCNC: ABNORMAL MG/DL
RBC # BLD AUTO: 4.85 MILLION/UL (ref 3.81–5.12)
RBC #/AREA URNS AUTO: ABNORMAL /HPF
RBC #/AREA URNS AUTO: ABNORMAL /HPF
SODIUM SERPL-SCNC: 142 MMOL/L (ref 135–147)
SP GR UR STRIP.AUTO: 1.03 (ref 1–1.03)
SP GR UR STRIP.AUTO: 1.03 (ref 1–1.03)
TRIGL SERPL-MCNC: 44 MG/DL (ref ?–150)
UROBILINOGEN UR STRIP-ACNC: 2 MG/DL
UROBILINOGEN UR STRIP-ACNC: 2 MG/DL
WBC # BLD AUTO: 3.97 THOUSAND/UL (ref 4.31–10.16)
WBC #/AREA URNS AUTO: ABNORMAL /HPF
WBC #/AREA URNS AUTO: ABNORMAL /HPF

## 2025-05-27 PROCEDURE — 81001 URINALYSIS AUTO W/SCOPE: CPT

## 2025-05-27 PROCEDURE — 85025 COMPLETE CBC W/AUTO DIFF WBC: CPT

## 2025-05-27 PROCEDURE — 36415 COLL VENOUS BLD VENIPUNCTURE: CPT

## 2025-05-27 PROCEDURE — 80061 LIPID PANEL: CPT

## 2025-05-27 PROCEDURE — 83036 HEMOGLOBIN GLYCOSYLATED A1C: CPT

## 2025-05-27 PROCEDURE — 80053 COMPREHEN METABOLIC PANEL: CPT

## 2025-05-29 ENCOUNTER — OFFICE VISIT (OUTPATIENT)
Age: 65
End: 2025-05-29
Payer: MEDICARE

## 2025-05-29 VITALS
HEART RATE: 82 BPM | BODY MASS INDEX: 19.66 KG/M2 | WEIGHT: 118 LBS | DIASTOLIC BLOOD PRESSURE: 80 MMHG | HEIGHT: 65 IN | OXYGEN SATURATION: 97 % | RESPIRATION RATE: 16 BRPM | TEMPERATURE: 98 F | SYSTOLIC BLOOD PRESSURE: 128 MMHG

## 2025-05-29 DIAGNOSIS — H02.79: ICD-10-CM

## 2025-05-29 DIAGNOSIS — M85.859 OSTEOPENIA OF NECK OF FEMUR, UNSPECIFIED LATERALITY: ICD-10-CM

## 2025-05-29 DIAGNOSIS — R30.0 DYSURIA: ICD-10-CM

## 2025-05-29 DIAGNOSIS — R39.9 UTI SYMPTOMS: Primary | ICD-10-CM

## 2025-05-29 DIAGNOSIS — R73.9 HYPERGLYCEMIA: ICD-10-CM

## 2025-05-29 DIAGNOSIS — E55.9 VITAMIN D DEFICIENCY: ICD-10-CM

## 2025-05-29 DIAGNOSIS — E78.01 FAMILIAL HYPERCHOLESTEROLEMIA: ICD-10-CM

## 2025-05-29 LAB
BILIRUB UR QL STRIP: NEGATIVE
CLARITY UR: CLEAR
COLOR UR: NORMAL
GLUCOSE UR STRIP-MCNC: NEGATIVE MG/DL
HGB UR QL STRIP.AUTO: NEGATIVE
KETONES UR STRIP-MCNC: NEGATIVE MG/DL
LEUKOCYTE ESTERASE UR QL STRIP: NEGATIVE
NITRITE UR QL STRIP: NEGATIVE
PH UR STRIP.AUTO: 6.5 [PH]
PROT UR STRIP-MCNC: NEGATIVE MG/DL
SL AMB  POCT GLUCOSE, UA: NORMAL
SL AMB LEUKOCYTE ESTERASE,UA: NORMAL
SL AMB POCT BILIRUBIN,UA: NORMAL
SL AMB POCT BLOOD,UA: NORMAL
SL AMB POCT CLARITY,UA: CLEAR
SL AMB POCT COLOR,UA: YELLOW
SL AMB POCT KETONES,UA: NORMAL
SL AMB POCT NITRITE,UA: NORMAL
SL AMB POCT PH,UA: 6
SL AMB POCT SPECIFIC GRAVITY,UA: 1.01
SL AMB POCT URINE PROTEIN: NORMAL
SL AMB POCT UROBILINOGEN: 0.2
SP GR UR STRIP.AUTO: 1.02 (ref 1–1.03)
UROBILINOGEN UR STRIP-ACNC: <2 MG/DL

## 2025-05-29 PROCEDURE — G2211 COMPLEX E/M VISIT ADD ON: HCPCS | Performed by: INTERNAL MEDICINE

## 2025-05-29 PROCEDURE — 81002 URINALYSIS NONAUTO W/O SCOPE: CPT | Performed by: INTERNAL MEDICINE

## 2025-05-29 PROCEDURE — 99214 OFFICE O/P EST MOD 30 MIN: CPT | Performed by: INTERNAL MEDICINE

## 2025-05-29 PROCEDURE — 81003 URINALYSIS AUTO W/O SCOPE: CPT | Performed by: INTERNAL MEDICINE

## 2025-05-29 NOTE — ASSESSMENT & PLAN NOTE
Patient did undergo DEXA scan showing some osteopenia of the femoral neck.  No osteoporosis.  She is already taking vitamin D supplements and calcium levels normal.  She is also involved with chronic aerobic activity.  Suggest that we check another DEXA scan in 2 years

## 2025-05-29 NOTE — ASSESSMENT & PLAN NOTE
Recently had problems with urination feeling like she had a bladder infection.  We did check a urinalysis today in the office and this was completely clear.  We did send the urine off for culture to make sure that there is no evidence of an occult infection.  Will call the patient if there is any evidence of or need for treatment

## 2025-05-29 NOTE — PROGRESS NOTES
Student's Name:   Bobby Devries Birth Date  2010  Sex  male  Race/Ethnicity   School/Grade Level/ID#     Address:   [address] [place] [address] [address] [place] [address]  Parent/Guardian             Telephone#                                            Home:                               Work:   IMMUNIZATIONS: To be completed by health care provider. The mo/da/yr for every dose administered is required. If a specific vaccine is medically contraindicated, a separate written statement must be attached by the health care responsible for completing the health examination explaining the medical reason for the contraindication.      Immunization History   Administered Date(s) Administered   • COVID Pfizer 12Y+ 06/23/2022   • COVID Pfizer 12Y+ (Requires Dilution) 11/12/2021, 12/03/2021   • COVID Pfizer Bivalent 12Y+ 12/05/2022   • COVID Pfizer/Comirnaty 12+ ([id number]) 10/23/2023   • DTaP(INFANRIX) 02/15/2011   • DTaP/HIB/IPV 01/26/2010, 03/11/2010, 05/13/2010   • DTaP/IPV 11/26/2014   • HPV 9-Valent 01/14/2021, 01/20/2022   • Hep A, ped/adol, 2 dose 05/19/2011, 11/15/2011   • Hep B, adolescent or pediatric 2009, 01/26/2010, 05/13/2010   • Hib (PRP-OMP) 02/15/2011   • Influenza, Unspecified Formulation 01/02/2011   • Influenza, intranasal, quadrivalent, live (FLUMIST) 08/14/2012, 08/24/2013, 09/27/2014, 11/11/2015   • Influenza, quadrivalent, multi-dose 11/15/2011   • Influenza, quadrivalent, preserve-free 10/07/2017, 09/19/2018, 10/19/2019, 11/02/2020, 11/12/2021, 10/12/2022   • Influenza, seasonal, injectable, trivalent 11/30/2010   • MMR 05/19/2011, 11/26/2014   • Meningococcal Conjugate MCV4O (Menveo) 01/14/2021   • Pneumococcal Conjugate 13 Valent Vacc (Prevnar 13) 11/30/2010   • Pneumococcal Conjugate 7 Valent 01/26/2010, 03/11/2010, 05/13/2010   • Rotavirus - pentavalent 01/26/2010, 03/11/2010, 05/13/2010   • Tdap 01/09/2020   • Varicella 11/30/2010, 11/26/2014      Immunizations given 2/26/2024: None     Name: Tiara Viera      : 1960      MRN: [id number]  Encounter Provider: Randall Lozano DO  Encounter Date: 2025   Encounter department: [facility] INTERNAL MEDICINE    Assessment & Plan  UTI symptoms    Orders:  •  POCT urine dip  •  UA (URINE) with reflex to Scope; Future    Familial hypercholesterolemia  History of hyperlipidemia.  This is a familial trait and very high HDL cholesterol.  Again patient is extremely conscientious as far as diet is concerned watching her intake of fats and cholesterol but admits that he is not always perfect.  Check another lipid profile in 6 months medication is not indicated at this time    Orders:  •  Lipid panel; Future    Hyperglycemia  Patient does have a history of elevated blood sugar readings.  No recent testing blood sugars up to 100.  Hemoglobin A1c is in the normal range.  This is an abnormal it has been consistent with this patient but no evidence of diabetes and patient is watching her diet closely and is with a normal exercise regime.    Orders:  •  Basic metabolic panel; Future  •  Hemoglobin A1C; Future    Vitamin D deficiency  Vitamin D level is normal.  She will continue with supplements.    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  Patient states that she does have a foreign body sensation in her eyes and does wear contacts.  May have a trial of a different contact that we will hold plain not cause any abrasion         Osteopenia of neck of femur, unspecified laterality  Patient did undergo DEXA scan showing some osteopenia of the femoral neck.  No osteoporosis.  She is already taking vitamin D supplements and calcium levels normal.  She is also involved with chronic aerobic activity.  Suggest that we check another DEXA scan in 2 years         Dysuria  Recently had problems with urination feeling like she had a bladder    Health care provider (MD, , APN, PA, school health professional, health official) verifying above immunization history must sign below. If adding dates to the above immunization history section, put your initials by date(s) and sign here.)  Signature                                                                 Title                                                Date 02/26/2024  _______________  ______________________________________________________________________  Signature                                                                 Title                                                Date  _____________________________________________________________________________________   ALTERNATIVE PROOF OF IMMUNITY   1. Clinical diagnosis (measles, mumps, hepatitis B) is allowed when verified by physician and supported with lab confirmation.  Attach copy of lab result.    * MEASLES (Rubeola)  MO   DA  YR          **MUMPS  MO   DA  YR             HEPATITIS B  MO   DA   YR           VARICELLA  MO   DA  YR      2. History of varicella (chickenpox) disease is acceptable if verified by health care provider, school health professional or health official.  Person signing below verifies that the parent/guardian’s description of varicella disease history is indicative of past infection and is accepting such history as documentation of disease.  Date of Disease                                  Signature                                                           Title   3. Laboratory Evidence of Immunity (check one)      __ Measles*         __ Mumps**        __ Rubella        __Varicella    (Attach copy of lab result)         *All measles cases diagnosed on or after July 1, 2002, must be confirmed by laboratory evidence.      **All mumps cases diagnosed on or after July 1, 2013, must be confirmed by laboratory evidence.     Completion of Alternative 1 or 3 MUST be accompanied by Labs & Physician Signature:  infection.  We did check a urinalysis today in the office and this was completely clear.  We did send the urine off for culture to make sure that there is no evidence of an occult infection.  Will call the patient if there is any evidence of or need for treatment              History of Present Illness     Patient is a 64-year-old female history of medical problems previously who is here today for follow-up after 6-month period of time.  She did have labs performed prior to the visit today and she is already bring to the results online weight discussed the results with the patient and she was reassured there is no major abnormalities.  Patient states she is doing well other than some problems with irritation in her right eye and she is seeing ophthalmology, also occasional dysuria and feeling possible urinary tract infection  Review of Systems   Constitutional: Negative.    HENT: Negative.     Eyes:  Positive for pain. Negative for photophobia, discharge, redness, itching and visual disturbance.   Respiratory: Negative.     Cardiovascular: Negative.    Gastrointestinal: Negative.    Endocrine: Negative.    Genitourinary:  Positive for dysuria. Negative for decreased urine volume, difficulty urinating, dyspareunia, enuresis, flank pain, frequency, genital sores, hematuria, menstrual problem, pelvic pain, urgency, vaginal bleeding, vaginal discharge and vaginal pain.   Musculoskeletal: Negative.    Skin: Negative.    Allergic/Immunologic: Negative.    Neurological: Negative.    Hematological: Negative.    Psychiatric/Behavioral: Negative.     Past Medical History[1]  Past Surgical History[2]  Family History[3]  Social History[4]  Medications[5]  Allergies   Allergen Reactions   • Codeine GI Intolerance and Abdominal Pain     Immunization History   Administered Date(s) Administered   • COVID-19 MODERNA VACC 0.5 ML IM 04/09/2021, 05/12/2021, 05/19/2022     Objective   /80   Pulse 82   Temp 98 °F (36.7 °C)    "Resp 16   Ht 5' 4.5\" (1.638 m)   Wt 53.5 kg (118 lb)   LMP  (LMP Unknown)   SpO2 97%   BMI 19.94 kg/m²     Physical Exam  Vitals and nursing note reviewed.   Constitutional:       General: She is not in acute distress.     Appearance: Normal appearance. She is normal weight. She is not ill-appearing, toxic-appearing or diaphoretic.      Comments: Pleasant, healthy appearing 64-year-old female who is awake alert no acute distress oriented x 3   HENT:      Head: Normocephalic and atraumatic.      Right Ear: Tympanic membrane, ear canal and external ear normal. There is no impacted cerumen.      Left Ear: Tympanic membrane, ear canal and external ear normal. There is no impacted cerumen.      Nose: Nose normal. No congestion or rhinorrhea.      Mouth/Throat:      Mouth: Mucous membranes are moist.      Pharynx: Oropharynx is clear. No oropharyngeal exudate or posterior oropharyngeal erythema.     Eyes:      General: No scleral icterus.        Right eye: No discharge.         Left eye: No discharge.      Extraocular Movements: Extraocular movements intact.      Conjunctiva/sclera: Conjunctivae normal.      Pupils: Pupils are equal, round, and reactive to light.     Neck:      Vascular: No carotid bruit.     Cardiovascular:      Rate and Rhythm: Normal rate and regular rhythm.      Pulses: Normal pulses.      Heart sounds: Normal heart sounds. No murmur heard.     No friction rub. No gallop.   Pulmonary:      Effort: Pulmonary effort is normal. No respiratory distress.      Breath sounds: Normal breath sounds. No stridor. No wheezing, rhonchi or rales.   Chest:      Chest wall: No tenderness.   Abdominal:      General: Abdomen is flat. Bowel sounds are normal. There is no distension.      Palpations: Abdomen is soft. There is no mass.      Tenderness: There is no abdominal tenderness. There is no right CVA tenderness, left CVA tenderness, guarding or rebound.      Hernia: No hernia is present.     Musculoskeletal:   " ___________________________  Physician Statements of Immunity MUST be submitted to [facility] for review.     Certificates of Islam Exemption to Immunizations or Physician Medical Statements of Medical Contraindication Are Reviewed   and Maintained by the School Authority     (11/2015)                                         (COMPLETE BOTH SIDES)                                  Approved [facility] SHP 3/2016      HEALTH HISTORY      TO BE COMPLETED AND SIGNED BY PARENT/GUARDIAN AND VERIFIED BY HEALTH CARE PROVIDER  ALLERGIES: (Food, drug, insect, other) is allergic to amoxicillin.   MEDICATION: (List all prescribed or taken on a regular basis.) currently has no medications in their medication list.   Diagnosis of asthma?  Child wakes during night coughing? Yes    No  Yes    No  Loss of function of one of paired  organs?(eye/ear/kidney/testicle) Yes    No    Birth defects? Yes    No  Hospitalizations? Yes    No    Developmental delay? Yes    No   When? What for? Yes    No    Blood disorders? Hemophilia,  Sickle Cell, Other? Explain. Yes    No  Surgery? (List all.)  When? What for? Yes    No    Diabetes? Yes    No  Serious injury or illness? Yes    No    Head injury/Concussion/Passed out? Yes    No  TB skin test positive (past/present)? * Yes    No *If yes, refer to [facility]   Seizures? What are they like?  Yes    No  TB disease (past or present)? * Yes    No *If yes, refer to [facility]   Heart problem/Shortness of breath?  Yes    No  Tobacco use (type, frequency)?  Yes    No    Heart murmur/High blood pressure? Yes    No  Alcohol/Drug use?  Yes    No    Dizziness or chest pain with exercise? Yes    No  Family history of sudden death  before age 50? (Cause?) Yes    No    Eye/Vision problems? ______           __Glasses          __Contacts  Last exam by eye doctor ______  Other concerns? (crossed eye, drooping lids, squinting, difficulty reading) Dental?   __ Braces     __ Bridge     __ Plate   __Other    Ear/Hearing problems? Yes    No  Information may be shared with appropriate personnel for health and educational purposes.   Bone/Joint problem/injury/scoliosis? Yes    No  Parent/Guardian  Signature                                               Date   PHYSICAL EXAMINATION REQUIREMENTS   Entire section below to be completed by MD//GERSON/PA Head Circumference if <2-3 years old - BP (!) 119/55   Pulse 69   Temp 99 °F (37.2 °C) (Temporal)   Ht 6' (1.829 m)   Wt 75.4 kg (166 lb 3 oz)   BMI 22.54 kg/m²   BSA 1.97 m²    83.2 %ile (Z= 0.96) based on CDC (Boys, 2-20 Years) BMI-for-age based on BMI available as of 2/26/2024.   DIABETES SCREENING (NOT REQUIRED FOR ) BMI>85% age/sex: No     And any two of the following: Family History: No  Ethnic Minority: No    Signs of Insulin Resistance (hypertension, dyslipidemia, polycystic ovarian syndrome, acanthosis nigricans): No  At Risk: No   LEAD RISK QUESTIONNAIRE Required for children age 6 months through 6 years enrolled in licensed or public school operated day care, , nursery school and/or . (Blood test required if resides in [place] or high risk zip code.)  Questionnaire Administered? No  Blood Test Indicated? No   Blood Test Date/Result:    TB SKIN OR BLOOD TEST Recommended only for children in high-risk groups including children immunosuppressed due to HIV infection or other conditions, frequent travel to or born in high prevalence countries or those exposed to adults in high-risk categories. See CDC guidelines. http://www.cdc.gov/tb/publications/factsheets/testing/TB_testing.htm.  Test Needed: No     Test performed: No                          Skin Test:    Date Read              /      /             Result:   Positive__      Negative__        mm________                          Blood Test: Date Reported       /      /               Result:  Positive__      Negative__      Value________  LAB TESTS (recommended) Date/Result  Date/Results  "      General: No swelling, tenderness, deformity or signs of injury. Normal range of motion.      Cervical back: Normal range of motion and neck supple. No rigidity or tenderness.      Right lower leg: No edema.      Left lower leg: No edema.   Lymphadenopathy:      Cervical: No cervical adenopathy.     Skin:     General: Skin is warm and dry.      Capillary Refill: Capillary refill takes less than 2 seconds.      Coloration: Skin is not jaundiced or pale.      Findings: No bruising, erythema, lesion or rash.     Neurological:      General: No focal deficit present.      Mental Status: She is alert and oriented to person, place, and time. Mental status is at baseline.      Cranial Nerves: No cranial nerve deficit.      Sensory: No sensory deficit.      Motor: No weakness.      Coordination: Coordination normal.      Gait: Gait normal.      Deep Tendon Reflexes: Reflexes normal.     Psychiatric:         Mood and Affect: Mood normal.         Behavior: Behavior normal.         Thought Content: Thought content normal.         Judgment: Judgment normal.            [1]  Past Medical History:  Diagnosis Date   • Anesthesia complication     \"Slow to Wake up\" post op   • Nonadherence to medical treatment     No show for 2 pelvic floor PT appt   [2]  Past Surgical History:  Procedure Laterality Date   • ECTROPIAN REPAIR Bilateral 2/23/2021    Procedure: ECTROPION REPAIR LLOU, LATERAL CANTHOPLASTY LLOU, ADJ TISSUE REARRANGEMENT CHEEK OU, ADJ TISSUE REARRANGEMENT LID OU W/LASER;  Surgeon: Crys Hinds MD;  Location:  MAIN OR;  Service: Ophthalmology   • FACELIFT      Mini-Done under local in Office   • MASS EXCISION Right 7/27/2021    Procedure: EXCISION BIOPSY LONG AND RING FINGER MASS;  Surgeon: José Jacobs MD;  Location: [facility] OR;  Service: Orthopedics   • UT COLONOSCOPY FLX DX W/COLLJ SPEC WHEN PFRMD N/A 8/23/2018    Procedure: COLONOSCOPY;  Surgeon: Jarvis Knutson MD;  [facility];  Service: "   Hemoglobin or Hematocrit 15.2 (1/9/2020) Sickle Cell (when indicated)    Urinalysis NA Developmental Screening Tool Normal - ASQ        SYSTEM REVIEW Normal  Comments/Follow-up/Needs  Normal Comments/Follow-up/Needs   Skin  Yes  Endocrine Yes    Ears Yes                  Screening Result Gastrointestinal Yes    Eyes Yes                  Screening Result: Genito-Urinary Yes                                      LMP:    Nose Yes  Neurologic Yes    Throat Yes  Musculoskeletal Yes    Mouth/Dental Yes  Spinal Exam Yes    Cardiovascular/HTN Yes  Nutritional status Yes    Respiratory Yes Diagnosis of Asthma: No   Mental Health Yes    Currently Prescribed Asthma Medication:        No  Quick-relief medication (e.g. Short Acting Beta Antagonist)        No  Controller medication (e.g. inhaled corticosteroid) Other     NEEDS/MODIFICATIONS required in the school setting: No restrictions DIETARY Needs/Restrictions: No restrictions   SPECIAL INSTRUCTIONS/DEVICES e.g. safety glasses, glass eye, chest protector for arrhythmia, pacemaker, prosthetic device, dental bridge, false teeth, athletic support/cup: No restrictions   MENTAL HEALTH/OTHER Is there anything else the school should know about this student?  If you would like to discuss this student’s health with school or school health personnel:  Not needed   EMERGENCY ACTION needed while at school due to child’s health condition (e.g. ,seizures, asthma, insect sting, food, peanut allergy, bleeding problem, diabetes, heart problem)?   No   On the basis of the examination on this day, I approve this child’s participation in                                (If No or Modified please attach explanation.)  PHYSICAL EDUCATION:  Yes                               [employer]   Yes   Print Name  Lisa L DO Emma         Signature                                                                         Date: 2/26/2024   Address:  [place] [address]  Gastroenterology   • TUBAL LIGATION     • WISDOM TOOTH EXTRACTION     [3]  Family History  Problem Relation Name Age of Onset   • No Known Problems Mother     • Heart attack Father  65   • No Known Problems Sister     • No Known Problems Sister     • No Known Problems Sister     • No Known Problems Brother     • No Known Problems Daughter     • No Known Problems Son     • Arthritis Family     • Breast cancer Neg Hx     • Ovarian cancer Neg Hx     • Colon cancer Neg Hx     [4]  Social History  Tobacco Use   • Smoking status: Never   • Smokeless tobacco: Never   Vaping Use   • Vaping status: Never Used   Substance and Sexual Activity   • Alcohol use: Yes     Comment: rare   • Drug use: No   • Sexual activity: Yes     Partners: Male     Birth control/protection: Post-menopausal     Comment: Martínez;  x 10 years, together 15   [5]  Current Outpatient Medications on File Prior to Visit   Medication Sig   • Ascorbic Acid (vitamin C) 1000 MG tablet Take 1,000 mg by mouth daily Will increase dose to 2000 mg day before surgery (Patient not taking: Reported on 3/18/2022)   • cetirizine (ZyrTEC) 10 mg tablet Take 5 mg by mouth as needed Takes 1/2 tablet = 5mg (Patient not taking: Reported on 3/18/2022)   • dextromethorphan-guaifenesin (MUCINEX DM)  MG per 12 hr tablet Take 1 tablet by mouth 2 (two) times a day as needed for cough (Patient not taking: Reported on 9/2/2021)   • fluticasone (FLONASE) 50 mcg/act nasal spray 2 sprays into each nostril daily (Patient not taking: Reported on 3/18/2022)   • Misc Natural Products (Curcumax Pro) TABS Take 2 tablets by mouth 3 (three) times a day Start day before surgery (Patient not taking: Reported on 3/18/2022)   • Neomycin-Polymyxin-Dexameth (Maxitrol) 0.1 % OINT Apply to eye Post op (Patient not taking: Reported on 3/18/2022)   • ondansetron (ZOFRAN) 4 mg tablet Take 1 tablet (4 mg total) by mouth every 8 (eight) hours as needed for nausea or vomiting (Patient not taking:  [facility] [place] [id number] [place] [id number] [facility]  [address]  [facility] [id number]  [phone]  [phone]         (Complete Both Sides)     Approved [facility] SHP 3/2016   Reported on 9/2/2021)   • valACYclovir (VALTREX) 1,000 mg tablet 1,000 mg Start day before surgery and continue till completed (Patient not taking: Reported on 9/2/2021)   • VITAMIN D PO Take 1,000 mg by mouth (Patient not taking: Reported on 5/29/2025)

## 2025-05-29 NOTE — ASSESSMENT & PLAN NOTE
Vitamin D level is normal.  She will continue with supplements.    Orders:    Vitamin D 1,25 dihydroxy; Future

## 2025-05-29 NOTE — ASSESSMENT & PLAN NOTE
Patient does have a history of elevated blood sugar readings.  No recent testing blood sugars up to 100.  Hemoglobin A1c is in the normal range.  This is an abnormal it has been consistent with this patient but no evidence of diabetes and patient is watching her diet closely and is with a normal exercise regime.    Orders:    Basic metabolic panel; Future    Hemoglobin A1C; Future

## 2025-05-29 NOTE — ASSESSMENT & PLAN NOTE
Is continuing to follow-up with ophthalmology.  Patient states that she does have a foreign body sensation in her eyes and does wear contacts.  May have a trial of a different contact that we will hold plain not cause any abrasion

## 2025-05-29 NOTE — ASSESSMENT & PLAN NOTE
History of hyperlipidemia.  This is a familial trait and very high HDL cholesterol.  Again patient is extremely conscientious as far as diet is concerned watching her intake of fats and cholesterol but admits that he is not always perfect.  Check another lipid profile in 6 months medication is not indicated at this time    Orders:    Lipid panel; Future

## 2025-08-05 ENCOUNTER — HOSPITAL ENCOUNTER (OUTPATIENT)
Dept: RADIOLOGY | Age: 65
Discharge: HOME | End: 2025-08-05
Attending: INTERNAL MEDICINE
Payer: MEDICARE

## 2025-08-05 DIAGNOSIS — Z12.31 VISIT FOR SCREENING MAMMOGRAM: ICD-10-CM

## 2025-08-05 PROCEDURE — 77067 SCR MAMMO BI INCL CAD: CPT

## 2025-08-05 PROCEDURE — 77063 BREAST TOMOSYNTHESIS BI: CPT

## (undated) DEVICE — SCD SEQUENTIAL COMPRESSION COMFORT SLEEVE MEDIUM KNEE LENGTH: Brand: KENDALL SCD

## (undated) DEVICE — SPONGE PVP SCRUB WING STERILE

## (undated) DEVICE — SUT VICRYL 6-0 P-1 18 IN J489G

## (undated) DEVICE — SKIN MARKER DUAL TIP WITH RULER CAP, FLEXIBLE RULER AND LABELS: Brand: DEVON

## (undated) DEVICE — DISPOSABLE EQUIPMENT COVER: Brand: SMALL TOWEL DRAPE

## (undated) DEVICE — POV-IOD SOLUTION 4OZ BT

## (undated) DEVICE — CHLORAPREP HI-LITE 26ML ORANGE

## (undated) DEVICE — INTENDED FOR TISSUE SEPARATION, AND OTHER PROCEDURES THAT REQUIRE A SHARP SURGICAL BLADE TO PUNCTURE OR CUT.: Brand: BARD-PARKER ® CARBON RIB-BACK BLADES

## (undated) DEVICE — NEEDLE 27 G X 1 1/2

## (undated) DEVICE — LIGHT HANDLE COVER SLEEVE DISP BLUE STELLAR

## (undated) DEVICE — INTENDED FOR TISSUE SEPARATION, AND OTHER PROCEDURES THAT REQUIRE A SHARP SURGICAL BLADE TO PUNCTURE OR CUT.: Brand: BARD-PARKER SAFETY BLADES SIZE 15, STERILE

## (undated) DEVICE — REUSABLE GEL PACKINSULATEDMEDIUM5.1 X 10.5 IN.: Brand: CARDINAL HEALTH

## (undated) DEVICE — SUT CHROMIC 6-0 790G

## (undated) DEVICE — NEEDLE 30 G X 1/2

## (undated) DEVICE — BASIC PACK: Brand: CONVERTORS

## (undated) DEVICE — PAD GROUNDING ADULT

## (undated) DEVICE — IMPERVIOUS STOCKINETTE: Brand: DEROYAL

## (undated) DEVICE — SILICONE GEL ADHESIVE HYDROCELLULAR FOAM DRESSING: Brand: ALLEVYN GENTLE BORDER 10X25CM

## (undated) DEVICE — ELECTRODE NEEDLE MOD E-Z CLEAN 2.75IN 7CM -0013M

## (undated) DEVICE — DRAPE FLUID WARMER (BIRD BATH)

## (undated) DEVICE — OCCLUSIVE GAUZE STRIP,3% BISMUTH TRIBROMOPHENATE IN PETROLATUM BLEND: Brand: XEROFORM

## (undated) DEVICE — SUT PROLENE 4-0 ON PS-5 NDL 8656G

## (undated) DEVICE — GLOVE SRG BIOGEL 7

## (undated) DEVICE — GLOVE SRG BIOGEL 6.5

## (undated) DEVICE — GLOVE INDICATOR PI UNDERGLOVE SZ 6.5 BLUE

## (undated) DEVICE — NEEDLE 25G X 1 1/2

## (undated) DEVICE — CUFF TOURNIQUET 18 X 4 IN QUICK CONNECT DISP 1 BLADDER

## (undated) DEVICE — COTTON TIP APPLICTOR 2 PK

## (undated) DEVICE — WIPES INSTRUMENT EYE DRAIN

## (undated) DEVICE — SPONGE 4 X 4 XRAY 16 PLY STRL LF RFD

## (undated) DEVICE — 1820 FOAM BLOCK NEEDLE COUNTER: Brand: DEVON

## (undated) DEVICE — GAUZE SPONGES,16 PLY: Brand: CURITY

## (undated) DEVICE — STERILE POLYISOPRENE POWDER-FREE SURGICAL GLOVES WITH EMOLLIENT COATING: Brand: PROTEXIS

## (undated) DEVICE — SYRINGE 1ML NEEDLE 25G X 5/8 SAFETY

## (undated) DEVICE — SYRINGE 10ML LL CONTROL TOP

## (undated) DEVICE — MINI BLADE ROUND TIP SHARP ON ONE SIDE

## (undated) DEVICE — STERILE BETHLEHEM PLASTIC HAND: Brand: CARDINAL HEALTH

## (undated) DEVICE — NEEDLE FILTER 5 MICR 19G X 1.5IN

## (undated) DEVICE — NEEDLE BLUNT 18 G X 1 1/2IN

## (undated) DEVICE — SYRINGE 3ML LL

## (undated) DEVICE — GLOVE INDICATOR PI UNDERGLOVE SZ 7 BLUE

## (undated) DEVICE — COTTON TIP APPLICATORS: Brand: DEROYAL